# Patient Record
Sex: FEMALE | Race: OTHER | URBAN - METROPOLITAN AREA
[De-identification: names, ages, dates, MRNs, and addresses within clinical notes are randomized per-mention and may not be internally consistent; named-entity substitution may affect disease eponyms.]

---

## 2023-05-31 ENCOUNTER — TELEPHONE (OUTPATIENT)
Dept: OBGYN CLINIC | Facility: CLINIC | Age: 23
End: 2023-05-31

## 2023-05-31 NOTE — TELEPHONE ENCOUNTER
Pts LMP of 4/7/2023 making her 7w5d. Pt states regular cycles of every 30 days. States +UPT. Pt informed of both male and female providers and the need to rotate PN appt with all since OB on-call will be the one that delivers her. Pt accepts rotation and wishes to proceed with establishing care. Pt instructed to start OTC PNV with DHA, FOLIC ACID AND IRON.   Pt accepts PC OBN on 6/13/2023    Stated HT: 5 ft 4in  Stated Pre-pregnancy WT: 147 lb

## 2023-07-13 LAB
AMB EXT ANTIBODY SCREEN: NEGATIVE
AMB EXT HBSAG SCREEN: NEGATIVE
AMB EXT HEMATOCRIT: 34.4
AMB EXT HEMOGLOBIN: 11
AMB EXT MCV: 91.5
AMB EXT PLATELETS: 282
AMB EXT RH FACTOR: POSITIVE
AMB EXT RUBELLA ANTIBODIES, IGG: 4.3
AMB EXT URINE CULTURE ROUTINE: NO GROWTH
AMB EXT VITAMIN D, 1, 25-DIHYDROXY: 17.5

## 2023-11-09 ENCOUNTER — TELEPHONE (OUTPATIENT)
Dept: OBGYN CLINIC | Facility: CLINIC | Age: 23
End: 2023-11-09

## 2023-11-09 NOTE — TELEPHONE ENCOUNTER
Records Received. Per Records Due Date 24    Name and  verified    Patient offered meet and greet and accepted. Scheduled  with Lisa Snell.

## 2023-11-13 ENCOUNTER — OFFICE VISIT (OUTPATIENT)
Dept: OBGYN CLINIC | Facility: CLINIC | Age: 23
End: 2023-11-13
Payer: COMMERCIAL

## 2023-11-13 DIAGNOSIS — Z71.89 PRENATAL CONSULT: Primary | ICD-10-CM

## 2023-11-14 ENCOUNTER — TELEPHONE (OUTPATIENT)
Dept: OBGYN CLINIC | Facility: CLINIC | Age: 23
End: 2023-11-14

## 2023-11-14 PROBLEM — Z87.898 HISTORY OF SYNCOPE: Status: ACTIVE | Noted: 2023-11-14

## 2023-11-14 PROBLEM — Z87.820 HISTORY OF TRAUMATIC BRAIN INJURY: Status: ACTIVE | Noted: 2022-03-21

## 2023-11-14 PROBLEM — S06.9XAA TRAUMATIC BRAIN INJURY (HCC): Status: ACTIVE | Noted: 2022-03-21

## 2023-11-14 PROBLEM — F41.9 ANXIETY: Status: ACTIVE | Noted: 2018-09-20

## 2023-11-14 PROBLEM — R55 SYNCOPE: Status: ACTIVE | Noted: 2023-11-14

## 2023-11-14 PROBLEM — I60.9 SUBARACHNOID HEMORRHAGE (HCC): Status: ACTIVE | Noted: 2023-11-14

## 2023-11-14 PROBLEM — Z86.79 HISTORY OF SUBARACHNOID HEMORRHAGE: Status: ACTIVE | Noted: 2023-11-14

## 2023-11-14 NOTE — TELEPHONE ENCOUNTER
Spoke with Dionte Amador. She would like patient to be seen this week with a midwife. Patient will need nurse education and visit with midwife this week. Spoke with Patient (name and  verified) patient scheduled for 11/15 8 am nurse education and then 1:45 pm New OB with MES.

## 2023-11-15 ENCOUNTER — NURSE ONLY (OUTPATIENT)
Dept: OBGYN CLINIC | Facility: CLINIC | Age: 23
End: 2023-11-15
Payer: COMMERCIAL

## 2023-11-15 ENCOUNTER — INITIAL PRENATAL (OUTPATIENT)
Dept: OBGYN CLINIC | Facility: CLINIC | Age: 23
End: 2023-11-15
Payer: COMMERCIAL

## 2023-11-15 VITALS — HEIGHT: 65 IN

## 2023-11-15 VITALS
HEART RATE: 103 BPM | WEIGHT: 199 LBS | DIASTOLIC BLOOD PRESSURE: 81 MMHG | SYSTOLIC BLOOD PRESSURE: 124 MMHG | BODY MASS INDEX: 33 KG/M2

## 2023-11-15 DIAGNOSIS — Z34.03 PRENATAL CARE, FIRST PREGNANCY IN THIRD TRIMESTER: Primary | ICD-10-CM

## 2023-11-15 DIAGNOSIS — Z34.90 ENCOUNTER FOR SUPERVISION OF NORMAL PREGNANCY, ANTEPARTUM, UNSPECIFIED GRAVIDITY: Primary | ICD-10-CM

## 2023-11-15 PROCEDURE — 90715 TDAP VACCINE 7 YRS/> IM: CPT | Performed by: ADVANCED PRACTICE MIDWIFE

## 2023-11-15 PROCEDURE — 3079F DIAST BP 80-89 MM HG: CPT | Performed by: ADVANCED PRACTICE MIDWIFE

## 2023-11-15 PROCEDURE — 90471 IMMUNIZATION ADMIN: CPT | Performed by: ADVANCED PRACTICE MIDWIFE

## 2023-11-15 PROCEDURE — 3074F SYST BP LT 130 MM HG: CPT | Performed by: ADVANCED PRACTICE MIDWIFE

## 2023-11-15 RX ORDER — CHOLECALCIFEROL (VITAMIN D3) 25 MCG
1 TABLET,CHEWABLE ORAL DAILY
COMMUNITY

## 2023-11-15 NOTE — PROGRESS NOTES
Manpreet Fregoso, is at 31w5d, here for her NOB visit/Transfer of Care. Currently, she is feeling well. Denies 3rd trimester danger signs. Accepts Tdap today. Declines flu shot and will check with insurance about RSV. Vital signs and weight reviewed  See flowsheets     Assessment/Plan: Tdap today. Will go to lab after visit. Ultrasound ordered  Next visit: 2 weeks    Reviewed:   Prenatal visit schedule  Recommendations and rationale for TDAP, flu shot, RSV, and COVID vaccine(s) in pregnancy   labor precautions  Kick counts  Danger signs    Pt verbalized understanding. All questions answered.  No barriers to learning identified

## 2023-11-15 NOTE — PROGRESS NOTES
Phone Nurse Education Completed  PT verbalized understanding     Labs from records entered in results console. Labs needed- Initial OB labs- HCV, Varicella,Hgb Electrophoresis, A1C.  28 week labs-1 hour GTT, CBC, HIV, Trep        Abnormal Pap- No  Last Pap- Beginning of this pregnancy  BMI- 22.47  Genetic Testing- Completed   Circumcision- Having a Girl  Feeding- Breast and Formula  Emergency Transfusion- Agreeable  EPDS-0  Type of birth- Desires Epidural  How Chase Bradshaw.. Has answered NO 5P questions and has NO  risk factors. Pt. Given What pregnant women need to know handout. Patient verbalized understanding that Access Information Managementt messaging is to be used for non-urgent medical questions, and to call the office with any vaginal bleeding, leaking of fluid, cramping, decreased fetal movement, or any other urgent medical question.

## 2023-11-15 NOTE — PROGRESS NOTES
Jourdan Foley is a  at 31.3wks. She was seen at [facility] in [facility] at beginning of pregnancy. Last visit was in August. She reports she was seeing a male provider and had wanted a female provider for delivery so decided to leave that practice. There was then a delay with getting on her partner's insurance. Pt. denies any medical conditions. Desires CNM care. Midwifery care & philosophy discussed. Practice guidelines discussed. Discussed need for anatomy ultrasound and third trimester labs if patient were to transfer. She states she is amenable to both and would make herself available to come in later this week if accepted into practice. Informed her I would consult with other midwives and we would make a decision regarding appropriateness for care.

## 2023-11-16 ENCOUNTER — HOSPITAL ENCOUNTER (OUTPATIENT)
Dept: ULTRASOUND IMAGING | Facility: HOSPITAL | Age: 23
Discharge: HOME | End: 2023-11-16
Attending: ADVANCED PRACTICE MIDWIFE
Payer: COMMERCIAL

## 2023-11-16 DIAGNOSIS — Z34.03 PRENATAL CARE, FIRST PREGNANCY IN THIRD TRIMESTER: ICD-10-CM

## 2023-11-16 PROCEDURE — 76805 OB US >/= 14 WKS SNGL FETUS: CPT | Performed by: ADVANCED PRACTICE MIDWIFE

## 2023-11-29 DIAGNOSIS — O44.00 PLACENTA PREVIA WITHOUT HEMORRHAGE, ANTEPARTUM: Primary | ICD-10-CM

## 2023-12-01 ENCOUNTER — PATIENT MESSAGE (OUTPATIENT)
Dept: PERINATAL CARE | Facility: HOSPITAL | Age: 23
End: 2023-12-01

## 2023-12-01 DIAGNOSIS — O24.419 GESTATIONAL DIABETES MELLITUS (GDM), ANTEPARTUM, GESTATIONAL DIABETES METHOD OF CONTROL UNSPECIFIED: Primary | ICD-10-CM

## 2023-12-04 ENCOUNTER — HOSPITAL ENCOUNTER (OUTPATIENT)
Dept: PERINATAL CARE | Facility: HOSPITAL | Age: 23
Discharge: HOME | End: 2023-12-04
Attending: OBSTETRICS & GYNECOLOGY
Payer: COMMERCIAL

## 2023-12-04 ENCOUNTER — TELEPHONE (OUTPATIENT)
Dept: OBGYN CLINIC | Facility: CLINIC | Age: 23
End: 2023-12-04

## 2023-12-04 ENCOUNTER — LAB ENCOUNTER (OUTPATIENT)
Dept: LAB | Facility: HOSPITAL | Age: 23
End: 2023-12-04
Attending: ADVANCED PRACTICE MIDWIFE
Payer: COMMERCIAL

## 2023-12-04 ENCOUNTER — ROUTINE PRENATAL (OUTPATIENT)
Dept: OBGYN CLINIC | Facility: CLINIC | Age: 23
End: 2023-12-04
Payer: COMMERCIAL

## 2023-12-04 ENCOUNTER — HOSPITAL ENCOUNTER (OUTPATIENT)
Dept: PERINATAL CARE | Facility: HOSPITAL | Age: 23
End: 2023-12-04
Attending: ADVANCED PRACTICE MIDWIFE
Payer: COMMERCIAL

## 2023-12-04 VITALS
HEART RATE: 111 BPM | WEIGHT: 211 LBS | DIASTOLIC BLOOD PRESSURE: 82 MMHG | SYSTOLIC BLOOD PRESSURE: 137 MMHG | BODY MASS INDEX: 35 KG/M2

## 2023-12-04 VITALS
BODY MASS INDEX: 35 KG/M2 | WEIGHT: 209 LBS | HEART RATE: 125 BPM | DIASTOLIC BLOOD PRESSURE: 79 MMHG | SYSTOLIC BLOOD PRESSURE: 142 MMHG

## 2023-12-04 DIAGNOSIS — O44.00 PLACENTA PREVIA WITHOUT HEMORRHAGE, ANTEPARTUM: ICD-10-CM

## 2023-12-04 DIAGNOSIS — Z34.03 PRENATAL CARE, FIRST PREGNANCY IN THIRD TRIMESTER: Primary | ICD-10-CM

## 2023-12-04 DIAGNOSIS — O44.00 PLACENTA PREVIA WITHOUT HEMORRHAGE, ANTEPARTUM: Primary | ICD-10-CM

## 2023-12-04 DIAGNOSIS — Z34.90 ENCOUNTER FOR SUPERVISION OF NORMAL PREGNANCY, ANTEPARTUM, UNSPECIFIED GRAVIDITY: ICD-10-CM

## 2023-12-04 LAB
DEPRECATED RDW RBC AUTO: 42.4 FL (ref 35.1–46.3)
ERYTHROCYTE [DISTWIDTH] IN BLOOD BY AUTOMATED COUNT: 14.5 % (ref 11–15)
EST. AVERAGE GLUCOSE BLD GHB EST-MCNC: 140 MG/DL (ref 68–126)
GLUCOSE 1H P GLC SERPL-MCNC: 190 MG/DL
HBA1C MFR BLD: 6.5 % (ref ?–5.7)
HCT VFR BLD AUTO: 31.3 %
HCV AB SERPL QL IA: NONREACTIVE
HGB BLD-MCNC: 10 G/DL
MCH RBC QN AUTO: 25.6 PG (ref 26–34)
MCHC RBC AUTO-ENTMCNC: 31.9 G/DL (ref 31–37)
MCV RBC AUTO: 80.1 FL
PLATELET # BLD AUTO: 331 10(3)UL (ref 150–450)
RBC # BLD AUTO: 3.91 X10(6)UL
WBC # BLD AUTO: 9.2 X10(3) UL (ref 4–11)

## 2023-12-04 PROCEDURE — 87389 HIV-1 AG W/HIV-1&-2 AB AG IA: CPT | Performed by: ADVANCED PRACTICE MIDWIFE

## 2023-12-04 PROCEDURE — 76817 TRANSVAGINAL US OBSTETRIC: CPT

## 2023-12-04 PROCEDURE — 3075F SYST BP GE 130 - 139MM HG: CPT | Performed by: ADVANCED PRACTICE MIDWIFE

## 2023-12-04 PROCEDURE — 86787 VARICELLA-ZOSTER ANTIBODY: CPT | Performed by: ADVANCED PRACTICE MIDWIFE

## 2023-12-04 PROCEDURE — 83036 HEMOGLOBIN GLYCOSYLATED A1C: CPT | Performed by: ADVANCED PRACTICE MIDWIFE

## 2023-12-04 PROCEDURE — 82950 GLUCOSE TEST: CPT | Performed by: ADVANCED PRACTICE MIDWIFE

## 2023-12-04 PROCEDURE — 83021 HEMOGLOBIN CHROMOTOGRAPHY: CPT | Performed by: ADVANCED PRACTICE MIDWIFE

## 2023-12-04 PROCEDURE — 86780 TREPONEMA PALLIDUM: CPT | Performed by: ADVANCED PRACTICE MIDWIFE

## 2023-12-04 PROCEDURE — 86803 HEPATITIS C AB TEST: CPT | Performed by: ADVANCED PRACTICE MIDWIFE

## 2023-12-04 PROCEDURE — 76815 OB US LIMITED FETUS(S): CPT | Performed by: OBSTETRICS & GYNECOLOGY

## 2023-12-04 PROCEDURE — 76819 FETAL BIOPHYS PROFIL W/O NST: CPT

## 2023-12-04 PROCEDURE — 3079F DIAST BP 80-89 MM HG: CPT | Performed by: ADVANCED PRACTICE MIDWIFE

## 2023-12-04 PROCEDURE — 85027 COMPLETE CBC AUTOMATED: CPT | Performed by: ADVANCED PRACTICE MIDWIFE

## 2023-12-04 PROCEDURE — 83020 HEMOGLOBIN ELECTROPHORESIS: CPT | Performed by: ADVANCED PRACTICE MIDWIFE

## 2023-12-04 NOTE — TELEPHONE ENCOUNTER
Incoming call from [facility]. Patient's BP was 142/79 when checked and did not have a recheck. Patient has a 3:45 appt with Patricio Woody today. Secure chat to Patricio Woody to make aware.

## 2023-12-04 NOTE — PROGRESS NOTES
Feeling well. Endorses regular fetal movement. Denies contractions, LOF, vaginal bleeding. Came from [facility]. Placenta no longer low lying. Baby cephalic. BP was mildly elevated there. Normal here. Denies HA, vision changes, epigastric pain. Will complete third trimester labs following visit. Reviewed warning signs and when to call.  LEEANNA 2 wks

## 2023-12-05 ENCOUNTER — TELEPHONE (OUTPATIENT)
Dept: OBGYN CLINIC | Facility: CLINIC | Age: 23
End: 2023-12-05

## 2023-12-05 ENCOUNTER — TELEPHONE (OUTPATIENT)
Dept: PERINATAL CARE | Facility: HOSPITAL | Age: 23
End: 2023-12-05

## 2023-12-05 DIAGNOSIS — O24.419 GESTATIONAL DIABETES MELLITUS (GDM) IN THIRD TRIMESTER, GESTATIONAL DIABETES METHOD OF CONTROL UNSPECIFIED (HCC): Primary | ICD-10-CM

## 2023-12-05 DIAGNOSIS — E11.9 DIABETES (HCC): Primary | ICD-10-CM

## 2023-12-05 NOTE — TELEPHONE ENCOUNTER
Spoke with Dr. Lynn Abrams regarding 1hr  and Hgb A1C of 6.5 at 34.4wks. Per his recommendation patient to be scheduled with diabetes educator, MFM for likely initiation of insulin and their office for transfer of care. Patient also to start checking glucose level QID. Phone call to patient to explain plan of care. Will coordinate appts through our office, MFM and MD group.

## 2023-12-05 NOTE — TELEPHONE ENCOUNTER
Pt is  Received call from Elvia Bonds. Pt's A1c and 1 hour glucose from yesterday shows that she is diabetic and will need to be started on insulin. Per elvia, pt has appointment with EMG next week for transfer of care, and M has been notified to schedule pt for an appointment.     Order for the diabetes center placed. Message left on the office's voicemail of pt's information and the need to be seen as soon as possible. Requested call back.    Pt's pharmacy- [id number] - [facility] [address], [phone], [phone] called and glucometer and lancets ordered for pt.      Pt called and informed that glucometer was ordered and that the diabetes center will be contacting her tomorrow to set up an appointment. Pt voices she will log her glucose reading in a log book for now.

## 2023-12-05 NOTE — TELEPHONE ENCOUNTER
Rcd call From MW Pt elevated A1c and 1 hour gtt   Pt to patricia to Dr [facility] Corrales group  MFM req to follow DE    Pt needs DE appointment  and supplies for testing  Spoke to Katiana GARCIA at Dr [facility] Corrales office    Spoke to PT re blood glucose flow sheet and process

## 2023-12-06 ENCOUNTER — TELEPHONE (OUTPATIENT)
Dept: OBGYN CLINIC | Facility: CLINIC | Age: 23
End: 2023-12-06

## 2023-12-06 LAB
HGB A2 MFR BLD: 2.2 % (ref 1.5–3.5)
HGB PNL BLD ELPH: 97.2 % (ref 95.5–100)
T PALLIDUM AB SER QL: NEGATIVE
VZV IGG SER IA-ACNC: 343.5 (ref 165–?)

## 2023-12-06 NOTE — TELEPHONE ENCOUNTER
Jesse Cox called from M. Pt has not had DM ed yet, so she has not started checking or logging her BS. Pt needs to have a log so that MFM can properly treat her. Pt is sked for DM ed on 12/7. RN sent staff msg to MA (pt has an appt on 12/14) to inform her.

## 2023-12-07 ENCOUNTER — HOSPITAL ENCOUNTER (OUTPATIENT)
Dept: ENDOCRINOLOGY | Age: 23
Discharge: HOME | End: 2023-12-07
Attending: ADVANCED PRACTICE MIDWIFE
Payer: COMMERCIAL

## 2023-12-07 DIAGNOSIS — O24.410 DIET CONTROLLED GESTATIONAL DIABETES MELLITUS (GDM) IN THIRD TRIMESTER: Primary | ICD-10-CM

## 2023-12-07 DIAGNOSIS — O24.419 GESTATIONAL DIABETES MELLITUS (GDM) IN THIRD TRIMESTER, GESTATIONAL DIABETES METHOD OF CONTROL UNSPECIFIED: ICD-10-CM

## 2023-12-07 NOTE — PROGRESS NOTES
Edil Talbert  : 2000 was seen for Gestational Diabetes Counseling: Individual  35 weeks gestation     Date: 2023   Start time: 1:30 pm End time: 2:30 pm     Obtained usual diet history: B: oatmeal with water  L: Chicken and rice and broccoli   D: chicken  and rice and some mac and cheese     Education:     GDM Overview:  Reviewed gestational diabetes as diagnosis including target blood glucose values. Benefits, risks, and management options for improving/maintaining glucose control to mother/baby discussed. Instructed /demonstrated ability to perform blood glucose testing on: One touch verio  BG 96  mg/dL fasting  Discussed monitoring ketones. Healthy Eating:  Discussed nutrition concepts for pregnancy/healthy eating and effects of food on BG value. Timing of meals; what is a carbohydrate, protein, fat. Taught: Carb counting, label reading, meal planning. Suggested Calorie Level: 2000    Being Active:  Benefits and effects of activity on BG discussed. Monitoring:  Instructed on how to use glucose monitor/proper lancet disposal. Testing schedules are:   Fastin-95 mg/dL, Call MD if >95 mg/dL twice in 1 week   2 Hour Post Prandial:  120 md/dL, Call MD if >120 mg/dL twice in 1 week. Taking Medication:  Reviewed when medication might be indicated. Reducing Risk:  Effects of elevated blood glucose on mother/baby reviewed. Discussed management (hyperglycemia, hypoglycemia, sick day, DKA, other) and when to call provider. Post pregnancy management/prevention of Type 2 DM, and increased risk of having diabetes later in life reviewed. Healthy Coping:  Family involvement/social support encouraged. Identification of lifestyle behaviors willing to change discussed. Training Tools Provided:  Printed materials covering each topic provided. Support Plan provided and reviewed. Patient Chosen Goals:      1. Follow recommended GDM meal plan.    2. Begin testing fasting glucose and 2 hours after meals   3. Bring glucose log to each MD office visit. 4. Encouraged activity if no restrictions. 5. Encouraged Ale Bergman to call diabetes center with any questions or concerns. Patient verbalized understanding and has no further questions at this time.     Ethan Kidd RN,MSN, [facility]

## 2023-12-12 ENCOUNTER — TELEPHONE (OUTPATIENT)
Dept: OBGYN CLINIC | Facility: CLINIC | Age: 23
End: 2023-12-12

## 2023-12-12 ENCOUNTER — TELEPHONE (OUTPATIENT)
Dept: PERINATAL CARE | Facility: HOSPITAL | Age: 23
End: 2023-12-12

## 2023-12-12 DIAGNOSIS — Z34.03 ENCOUNTER FOR SUPERVISION OF NORMAL FIRST PREGNANCY IN THIRD TRIMESTER: Primary | ICD-10-CM

## 2023-12-12 NOTE — TELEPHONE ENCOUNTER
RN spoke with pt and sked her for growth US at OP office on 12/13. Directions sent via [employer]. Pt verbalized understanding and agreed with POC.

## 2023-12-12 NOTE — TELEPHONE ENCOUNTER
35w4d  Received BS log for date range 12/6-12/11     Low  High Out of Range   Fasting Blood Sugar 88 107 5/6   Post Breakfast 87 123 1/6   Post Lunch 101 150 1/6   Post Dinner 106 133 2/6     Diet    Pt had A1c   6.5 with    MW  transferred care to [facility] group  Req we follow blood glucose

## 2023-12-12 NOTE — TELEPHONE ENCOUNTER
SHERLY (per MFM): RN spoke with Shravan Rutledge at [facility]. Pt's BS readings were assessed and pt started on metformin. Pt has an appt with MA on 12/14. If MA would like pt to be scanned by [facility], MA can place orders at that time and [facility] will make an appt for the pt. 10 mins  JIMI Marlow MD  yes, MFM scan is OK. Please place order. thanks  9 mins  For growth?  9 mins  JIMI Marlow MD  who is this patient?! LOL NVM. Can you see if she can get in with Ambreen for a growth scan? thanks  8 mins  Before she sees you on the 14th? She's a BRAYDEN from the midwives.   7 mins  JIMI Marlow MD  sure, yes

## 2023-12-12 NOTE — TELEPHONE ENCOUNTER
RN spoke with Naida Hutchins at [facility]. Pt's BS readings were assessed and pt started on metformin. Pt has an appt with MA on 12/14. If MA would like pt to be scanned by MFM, MA can place orders at that time and MFM will make an appt for the pt.

## 2023-12-12 NOTE — TELEPHONE ENCOUNTER
New regimen:    Start metformin 500 mg p.o. nightly -please inform patient that prescription was sent to her pharmacy    Caleb Mejía. Joanne Bledsoe M.D.   [facility]

## 2023-12-13 ENCOUNTER — ULTRASOUND ENCOUNTER (OUTPATIENT)
Dept: OBGYN CLINIC | Facility: CLINIC | Age: 23
End: 2023-12-13
Payer: COMMERCIAL

## 2023-12-13 DIAGNOSIS — Z34.03 ENCOUNTER FOR SUPERVISION OF NORMAL FIRST PREGNANCY IN THIRD TRIMESTER: ICD-10-CM

## 2023-12-13 PROCEDURE — 76816 OB US FOLLOW-UP PER FETUS: CPT | Performed by: OBSTETRICS & GYNECOLOGY

## 2023-12-14 ENCOUNTER — HOSPITAL ENCOUNTER (OUTPATIENT)
Dept: ENDOCRINOLOGY | Age: 23
Discharge: HOME | End: 2023-12-14
Attending: ADVANCED PRACTICE MIDWIFE
Payer: COMMERCIAL

## 2023-12-14 ENCOUNTER — INITIAL PRENATAL (OUTPATIENT)
Dept: OBGYN CLINIC | Facility: CLINIC | Age: 23
End: 2023-12-14
Payer: COMMERCIAL

## 2023-12-14 VITALS
SYSTOLIC BLOOD PRESSURE: 124 MMHG | WEIGHT: 210.63 LBS | DIASTOLIC BLOOD PRESSURE: 74 MMHG | BODY MASS INDEX: 35.09 KG/M2 | HEIGHT: 65 IN

## 2023-12-14 DIAGNOSIS — Z34.90 ENCOUNTER FOR SUPERVISION OF NORMAL PREGNANCY, ANTEPARTUM, UNSPECIFIED GRAVIDITY: Primary | ICD-10-CM

## 2023-12-14 DIAGNOSIS — O24.410 DIET CONTROLLED GESTATIONAL DIABETES MELLITUS (GDM) IN THIRD TRIMESTER: Primary | ICD-10-CM

## 2023-12-14 DIAGNOSIS — O24.419 GESTATIONAL DIABETES MELLITUS (GDM) IN THIRD TRIMESTER, GESTATIONAL DIABETES METHOD OF CONTROL UNSPECIFIED: ICD-10-CM

## 2023-12-14 PROCEDURE — 3008F BODY MASS INDEX DOCD: CPT | Performed by: OBSTETRICS & GYNECOLOGY

## 2023-12-14 PROCEDURE — 87150 DNA/RNA AMPLIFIED PROBE: CPT | Performed by: OBSTETRICS & GYNECOLOGY

## 2023-12-14 PROCEDURE — 3074F SYST BP LT 130 MM HG: CPT | Performed by: OBSTETRICS & GYNECOLOGY

## 2023-12-14 PROCEDURE — 87081 CULTURE SCREEN ONLY: CPT | Performed by: OBSTETRICS & GYNECOLOGY

## 2023-12-14 PROCEDURE — 59025 FETAL NON-STRESS TEST: CPT | Performed by: OBSTETRICS & GYNECOLOGY

## 2023-12-14 PROCEDURE — 3078F DIAST BP <80 MM HG: CPT | Performed by: OBSTETRICS & GYNECOLOGY

## 2023-12-14 NOTE — PROGRESS NOTES
Nathan Garcia  : 2000 was seen for GDM  Individual Follow-Up Counseling   Video Visit    Due to COVID-19 ACTION PLAN, the patient's office visit was conducted via real-time interactive audio and video. The patient verbally consents to an audio and video consultation today which was conducted due to Covid-19/public health pandemic. The patient understands and accepts financial responsibility for any deductible, co-insurance and/or co-pays associated with this service. Please note that this visit was completed using two-way, real-time interactive audio and video communication. This has been done in good micheline to provide diabetes education during the on-going public health crisis/national emergency and because of restrictions of face-to-face office visits. Date: 2023  Referring Provider: Isabella Mehta Start time: 1030 End time: 1130      Assessment: There were no vitals taken for this visit. Weight:   Wt Readings from Last 6 Encounters:   23 209 lb   23 211 lb   11/15/23 199 lb       Blood Glucose: All glucose levels have been within normal limits  :  Patient reports taking 500 mg of Metformin q evening and fasting numbers are now within normal limits    Gestational Diabetes goals assessed by patient: 4 - frequently, continue with current goals/plan    Diet:     Following meal plan: yes  Skips: no meals are Meals are Balanced. Carb Intake is adequate. Protein Intake is adequate. Food Selections are healthy. Exercise:     Some walking    Education:     GDM Overview:  Reviewed target blood glucose values for GDM. Discussed benefits/risks to mother/baby management options to improve/maintain glucose control. Healthy Eating:  Reviewed/Reinforced:  Nutrition concepts for pregnancy/healthy eating and effect of food on blood glucose. Meal planning process and benefits of pre-planning meals/snacks. Appropriate timing of meals/snacks. Carb counting.   Additional concepts: Increasing fiber    Being Active:  Reviewed benefits of effects of activity on BG values. Reviewed types of activity, duration, precautions. Monitoring:  Instructed to report readings to MD as directed. Call MD: if FBG is > 95 twice in 1 week. If 2 hr PP is > 120 twice in 1 week at any one meal.  Call Diabetic Educator is ketones are consistently elevated. Taking Medication:  Reviewed appropriate timing (if on insulin) of meds. Reviewed probability of needing medication adjustments throughout pregnancy. Reducing Risk:  Discussed management of (hyperglycemia, hypoglycemia) and when to call provider. Recommendations:      1. Follow recommended meal plan. 2. Test blood glucose and ketones as directed. 3. Bring glucose log to each MD visit. 4. Start/continue activity if no restrictions. 5. Additional recommendations: continue to follow OB protocol until delivery. Patient verbalized understanding and has no further questions at this time.     Carlyn Gardner, RN,MSN, CDCES, PMHNP-BC

## 2023-12-14 NOTE — PROGRESS NOTES
[facility]  Obstetrics and Gynecology  History & Physical    CC: Patient is here to establish prenatal care     Subjective:     HPI:  Abiodun Silva is a 24year old  female at 26w5d who presents today to establish prenatal care. Transfer from [facility] for [address] - started on metformin 2 days ago. MFM following. Patient reports no c/o. Denies vaginal bleeding, abdominal/pelvic pain, nausea and vomiting. LMP: No LMP recorded. Patient is pregnant. AUDREY:  Estimated Date of Delivery: 24    OB:  OB History    Para Term  AB Living   1             SAB IAB Ectopic Multiple Live Births                  # Outcome Date GA Lbr Marko/2nd Weight Sex Delivery Anes PTL Lv   1 Current                  GYN:   Hx Prior Abnormal Pap: No (11/15/2023  8:04 AM)      PMH:   Past Medical History:   Diagnosis Date    Iron deficiency        PSH:  No past surgical history on file. MEDS:    Current Outpatient Medications:     metFORMIN 500 MG Oral Tab, Take 1 tablet (500 mg total) by mouth every evening., Disp: 30 tablet, Rfl: 3    prenatal vitamin with DHA 27-0.8-228 MG Oral Cap, Take 1 capsule by mouth daily. , Disp: , Rfl:     Allergies:    Not on File    Immunizations:  Immunization History   Administered Date(s) Administered    BCG 2000    DTAP 2000, 10/12/1999, 10/03/2003    DTAP INFANRIX 10/03/2003    DTP 2000, 10/25/1999, 12/10/1999, 2001    HEP A,Ped/Adol,(2 Dose) 2008, 2009    HEP B, Ped/Adol 2003, 10/03/2003    HEP B/HIB 10/23/2002    HIB (4 Dose) 10/12/1999    HPV (Gardasil) 2011, 2012, 2013    Hib, Unspecified Formulation 1999, 10/12/1999, 10/25/1999, 12/10/1999    IPV 1999, 10/12/1999, 2001, 10/03/2003    Influenza 2013    MMR 2000, 10/03/2003    Meningococcal-Menactra 2011, 2015    OPV 1999, 10/25/1999, 12/10/1999    TD 2009    TDAP 2013, 11/15/2023       Family Hx: Family History   Problem Relation Age of Onset    No Known Problems Father     No Known Problems Mother     Hypertension Paternal Grandmother     High Cholesterol Paternal Grandmother        SocialHx:        Social History     Socioeconomic History    Marital status:    Tobacco Use    Smoking status: Never    Smokeless tobacco: Never   Substance and Sexual Activity    Alcohol use: Not Currently    Drug use: Never   Other Topics Concern    Caffeine Concern No    Special Diet No     Social Determinants of Health     Financial Resource Strain: Low Risk  (2023)    Financial Resource Strain     Difficulty of Paying Living Expenses: Not hard at all     Med Affordability: No   Food Insecurity: No Food Insecurity (2023)    Food Insecurity     Food Insecurity: Never true   Transportation Needs: No Transportation Needs (2023)    Transportation Needs     Lack of Transportation: No   Stress: No Stress Concern Present (2023)    Stress     Feeling of Stress : No   Housing Stability: Low Risk  (2023)    Housing Stability     Housing Instability: No         Review of Systems:  General: no complaints  Eyes: no complaints  Respiratory: no complaints  Cardiovascular: no complaints  GI: no complaints  : no complaints See HPI  Hematological/lymphatic: no complaints  Breast: no complaints  Psychiatric: no complaints  Endocrine:no complaints  Neurological: no complaints  Immunological: no complaints  Musculoskeletal:no complaints    Objective:     GENERAL: well developed, well nourished, in no apparent distress, alert and orientated X 3  PSYCH: mood and affect stable    ABDOMEN: Soft, non distended; non tender, no masses  GYNE/: deferred    R/V: normal perineum, no hemorrhoids  EXTREMITIES:  Normal range of motion, strength 5/5, nontender without edema    Fetal Well Being Assessment:    Reactive NST 140s    Assessment/Plan:     Anthony Carlton is a 24year old  female at 26w5d who presents today to establish prenatal care. Patient Active Problem List   Diagnosis    Anxiety    History of subarachnoid hemorrhage    History of traumatic brain injury    History of syncope    Placenta previa without hemorrhage, antepartum         ICD-10-CM    1. Encounter for supervision of normal pregnancy, antepartum, unspecified   Z34.90       2. Gestational diabetes mellitus (GDM) in third trimester, gestational diabetes method of control unspecified  O24.419 HIV AG AB Combo     TREP     Group B Strep PCR        Comment   Transabdominal images taken   Fetal growth appears to be 88.7 %. 3429 g  Overall Fetal size measuring larger than dates. RHODA is normal.   Placental location is Posterior   Fetal Position is Vertex       Prenatal care  - prenatal labs up to date  - GBS sent    [address]  - on metformin. MFM following sugars. - patient counseled on risks during pregnancy - stillbirth, labor/shoulder dystocia, fetal macrosomia, inc operative delivery, fetal distress, hemorrhage, etc. All questions answered. - weekly NSTs for now.    - consider induction of labor likely 39 weeks    RTC in 1 weeks     Soel Owen MD    [facility]

## 2023-12-15 LAB — GROUP B STREP BY PCR FOR PCR OVT: NEGATIVE

## 2023-12-18 ENCOUNTER — TELEPHONE (OUTPATIENT)
Dept: PERINATAL CARE | Facility: HOSPITAL | Age: 23
End: 2023-12-18

## 2023-12-19 ENCOUNTER — ROUTINE PRENATAL (OUTPATIENT)
Dept: OBGYN CLINIC | Facility: CLINIC | Age: 23
End: 2023-12-19
Payer: COMMERCIAL

## 2023-12-19 ENCOUNTER — TELEPHONE (OUTPATIENT)
Dept: OBGYN CLINIC | Facility: CLINIC | Age: 23
End: 2023-12-19

## 2023-12-19 VITALS
DIASTOLIC BLOOD PRESSURE: 76 MMHG | HEIGHT: 65 IN | SYSTOLIC BLOOD PRESSURE: 124 MMHG | WEIGHT: 203.81 LBS | BODY MASS INDEX: 33.96 KG/M2

## 2023-12-19 DIAGNOSIS — O24.415 ORAL HYPOGLYCEMIC CONTROLLED WHITE CLASSIFICATION A2 GESTATIONAL DIABETES MELLITUS (GDM): Primary | ICD-10-CM

## 2023-12-19 PROBLEM — O44.00 PLACENTA PREVIA WITHOUT HEMORRHAGE, ANTEPARTUM: Status: RESOLVED | Noted: 2023-11-29 | Resolved: 2023-12-19

## 2023-12-19 PROBLEM — O44.00 PLACENTA PREVIA WITHOUT HEMORRHAGE, ANTEPARTUM (HCC): Status: RESOLVED | Noted: 2023-11-29 | Resolved: 2023-12-19

## 2023-12-19 PROCEDURE — 3078F DIAST BP <80 MM HG: CPT | Performed by: OBSTETRICS & GYNECOLOGY

## 2023-12-19 PROCEDURE — 3074F SYST BP LT 130 MM HG: CPT | Performed by: OBSTETRICS & GYNECOLOGY

## 2023-12-19 PROCEDURE — 59025 FETAL NON-STRESS TEST: CPT | Performed by: OBSTETRICS & GYNECOLOGY

## 2023-12-19 PROCEDURE — 3008F BODY MASS INDEX DOCD: CPT | Performed by: OBSTETRICS & GYNECOLOGY

## 2023-12-19 NOTE — PROGRESS NOTES
No c/o. Denies pain, bleeding, LOF, positive fetal movement    Reactive NST    Ultrasound :  Fetal growth appears to be 88.7 %. RHODA is normal.   Placental location is Posterior   Fetal Position is Vertex     24year old  at 37w2d by 10/ wk US     Return OB  Pre-Joann Care: UTD.  Induction of labor at 39 weeks requested  Patient Active Problem List   Diagnosis    Anxiety    History of subarachnoid hemorrhage    History of traumatic brain injury    History of syncope    Oral hypoglycemic controlled White classification A2 gestational diabetes mellitus (GDM)     Continue weekly NSTs  - Return to clinic in: 1

## 2023-12-19 NOTE — TELEPHONE ENCOUNTER
----- Message from Neva Simpson MD sent at 12/19/2023  2:48 PM CST -----  Regarding: cervical ripening  Hi  Please schedule cervical ripening at 39 weeks [facility] 1/12/24) for this patient with A2 gestational DM.    Thanks  Dr. Tyler Dill

## 2023-12-20 ENCOUNTER — TELEPHONE (OUTPATIENT)
Dept: PERINATAL CARE | Facility: HOSPITAL | Age: 23
End: 2023-12-20

## 2023-12-20 NOTE — TELEPHONE ENCOUNTER
Blood Glucose flow sheet  reviewed  through 12/17        Recommendations         metformin 500 mg p.o. nightly        Blood glucose monitoring   With weekly review by MACIE

## 2023-12-26 ENCOUNTER — TELEPHONE (OUTPATIENT)
Dept: PERINATAL CARE | Facility: HOSPITAL | Age: 23
End: 2023-12-26

## 2023-12-26 NOTE — TELEPHONE ENCOUNTER
Blood Glucose flow sheet  reviewed  through 12/25        Recommendations           metformin 500 mg p.o. nightly          Blood glucose monitoring   With weekly review by MFM       My chart message sent with recommendations

## 2023-12-28 ENCOUNTER — ROUTINE PRENATAL (OUTPATIENT)
Dept: OBGYN CLINIC | Facility: CLINIC | Age: 23
End: 2023-12-28
Payer: COMMERCIAL

## 2023-12-28 VITALS — WEIGHT: 201.19 LBS | DIASTOLIC BLOOD PRESSURE: 78 MMHG | SYSTOLIC BLOOD PRESSURE: 126 MMHG | BODY MASS INDEX: 33 KG/M2

## 2023-12-28 DIAGNOSIS — O24.419 GESTATIONAL DIABETES MELLITUS (GDM) IN THIRD TRIMESTER, GESTATIONAL DIABETES METHOD OF CONTROL UNSPECIFIED: Primary | ICD-10-CM

## 2023-12-28 PROCEDURE — 3078F DIAST BP <80 MM HG: CPT | Performed by: OBSTETRICS & GYNECOLOGY

## 2023-12-28 PROCEDURE — 59025 FETAL NON-STRESS TEST: CPT | Performed by: OBSTETRICS & GYNECOLOGY

## 2023-12-28 PROCEDURE — 3074F SYST BP LT 130 MM HG: CPT | Performed by: OBSTETRICS & GYNECOLOGY

## 2023-12-28 NOTE — PROGRESS NOTES
No c/o. Denies pain, bleeding, LOF, positive fetal movement    Reactive NST    Ultrasound :  Fetal growth appears to be 88.7 %. RHODA is normal.   Placental location is Posterior   Fetal Position is Vertex     24year old  at 37w6d by 10/ wk US     Return OB  Pre-Joann Care: UTD.  Induction of labor at 39 weeks requested  Patient Active Problem List   Diagnosis    Anxiety    History of subarachnoid hemorrhage    History of traumatic brain injury    History of syncope    Oral hypoglycemic controlled White classification A2 gestational diabetes mellitus (GDM)     Continue weekly NSTs  - Return to clinic in: 1

## 2023-12-30 ENCOUNTER — PATIENT MESSAGE (OUTPATIENT)
Dept: OBGYN CLINIC | Facility: CLINIC | Age: 23
End: 2023-12-30

## 2023-12-31 ENCOUNTER — HOSPITAL ENCOUNTER (OUTPATIENT)
Facility: HOSPITAL | Age: 23
Discharge: HOME | End: 2023-12-31
Attending: OBSTETRICS & GYNECOLOGY | Admitting: OBSTETRICS & GYNECOLOGY
Payer: COMMERCIAL

## 2023-12-31 VITALS — HEART RATE: 103 BPM | SYSTOLIC BLOOD PRESSURE: 134 MMHG | DIASTOLIC BLOOD PRESSURE: 82 MMHG

## 2023-12-31 PROCEDURE — 59025 FETAL NON-STRESS TEST: CPT | Performed by: OBSTETRICS & GYNECOLOGY

## 2023-12-31 NOTE — TRIAGE
[facility]      Triage Note    Jennifer Arora Patient Status:  Outpatient    2000 MRN [id number]   Location [address] Attending Kirsty Eagle MD   Hosp Day # 0 PCP No primary care provider on file.  Para:   Estimated Date of Delivery: 24  Gestation: 36w4d    Chief Complaint     Complication         Allergies:  Not on File    Orders Placed This Encounter   Procedures    Bile Acids       Lab Results   Component Value Date    WBC 9.2 2023    HGB 10.0 (L) 2023    HCT 31.3 (L) 2023    .0 2023    RPR Nonrective 2023       Clinitek UA  Lab Results   Component Value Date    URINECUL No Growth 2023       UA  No results found for: \"COLORUR\", \"CLARITY\", \"SPECGRAVITY\", \"PROUR\", \"GLUUR\", \"KETUR\", \"BILUR\", \"BLOODURINE\", \"NITRITE\", \"UROBILINOGEN\", \"LEUUR\", \"UASA\"    Vitals:    23 1455 23 1515   BP: 142/79 134/82   Pulse: 111 103       NST  Variability: Moderate           Accelerations: Yes           Decelerations: None            Baseline: 145 BPM           Uterine Irritability: No           Contractions: Irregular                                        Acoustic Stimulator: No           Nonstress Test Interpretation: Appropriate for gestational age           Nonstress Test Second Interpretation: Reactive                        Additional Comments       Chief Complaint   Patient presents with     Complication     Sent from home with c/o itching     Pt c/o itching all over for past few days. Bile acids drawn as ordered. NST reactive with irregular contractions noted. Dr Marlyn Christopher informed of findings. Discharge order received. Pt instructed to notify OB of worsening symptome. Kick count and labor precautions reviewed with pt. Pt states understanding. Discharged to home.     Mariana Patricio RN  2023 3:47 PM

## 2023-12-31 NOTE — PROGRESS NOTES
Pt is a 24year old female admitted to TR1/TR1-A. Chief Complaint   Patient presents with     Complication     Sent from home with c/o itching      Pt is  38w2d intra-uterine pregnancy. History obtained, consents signed. Oriented to room, staff, and plan of care.

## 2024-01-01 PROBLEM — L29.9 ITCHING: Status: ACTIVE | Noted: 2024-01-01

## 2024-01-02 ENCOUNTER — TELEPHONE (OUTPATIENT)
Dept: PERINATAL CARE | Facility: HOSPITAL | Age: 24
End: 2024-01-02

## 2024-01-02 NOTE — NST
Nonstress Test   Patient: Yesika Hayward    Gestation: 38w3d    Diagnosis from order: Suspected cholestasis of pregnancy, Itching. NST:        12/31/2023   NST DOCUMENTATION   Variability 6-25 BPM   Accelerations Yes   Decelerations None   Baseline 145 BPM   Uterine Irritability No   Contractions Irregular   Acoustic Stimulator No   Nonstress Test Interpretation Appropriate for gestational age   Nonstress Test Second Interpretation Reactive         I agree with the above evaluation. NST completed. Dr. Jasmina Davies MD    [facility]     This note was created by [facility] voice recognition. Errors in content may be related to improper recognition by the system; efforts to review and correct have been done but errors may still exist. Please be advised the primary purpose of this note is for me to communicate medical care. Standard sentence structure is not always used. Medical terminology and medical abbreviations may be used. There may be grammatical, typographical, and automated fill ins that may have errors missed in proofreading.

## 2024-01-03 ENCOUNTER — TELEPHONE (OUTPATIENT)
Dept: PERINATAL CARE | Facility: HOSPITAL | Age: 24
End: 2024-01-03

## 2024-01-03 LAB — BILE ACIDS: 4.4 UMOL/L

## 2024-01-03 NOTE — TELEPHONE ENCOUNTER
Blood Glucose flow sheet  reviewed  through 12/31/24        Recommendations    metformin 500 mg p.o. nightl     Blood glucose monitoring   With weekly review by MACIE

## 2024-01-04 ENCOUNTER — TELEPHONE (OUTPATIENT)
Dept: OBGYN CLINIC | Facility: CLINIC | Age: 24
End: 2024-01-04

## 2024-01-04 NOTE — TELEPHONE ENCOUNTER
LMTCO regarding NST 1/5/2024. Requesting patient come in at 12p or earlier. [facility] only has 1 NST machine and 3 scheduled back to back. [id number] message sent      LMTCO regarding NST 1/5/2024. Requesting patient come in at 12p or earlier. [facility] only has 1 NST machine and 3 scheduled back to back

## 2024-01-05 ENCOUNTER — ROUTINE PRENATAL (OUTPATIENT)
Dept: OBGYN CLINIC | Facility: CLINIC | Age: 24
End: 2024-01-05
Payer: COMMERCIAL

## 2024-01-05 VITALS
BODY MASS INDEX: 33.19 KG/M2 | WEIGHT: 199.19 LBS | HEIGHT: 65 IN | DIASTOLIC BLOOD PRESSURE: 74 MMHG | SYSTOLIC BLOOD PRESSURE: 118 MMHG

## 2024-01-05 DIAGNOSIS — Z36.9 UNSPECIFIED ANTENATAL SCREENING: Primary | ICD-10-CM

## 2024-01-05 PROCEDURE — 3074F SYST BP LT 130 MM HG: CPT | Performed by: OBSTETRICS & GYNECOLOGY

## 2024-01-05 PROCEDURE — 3008F BODY MASS INDEX DOCD: CPT | Performed by: OBSTETRICS & GYNECOLOGY

## 2024-01-05 PROCEDURE — 59025 FETAL NON-STRESS TEST: CPT | Performed by: OBSTETRICS & GYNECOLOGY

## 2024-01-05 PROCEDURE — 3078F DIAST BP <80 MM HG: CPT | Performed by: OBSTETRICS & GYNECOLOGY

## 2024-01-05 NOTE — PROGRESS NOTES
Doing well. No OB complaints. +FM.   Noted diffuse rash that is extremely itchy. Has not tried OTC meds yet. PUPPS noted on exam. Recommend Calamine lotion and topical benadryl. PO benadryl also ok.   BG well controlled with Metformin.   Induction of labor scheduled for Wednesday.     LEEANNA postpartum.     Dr. Jase Dowd MD    [facility]     This note was created by [employer] voice recognition. Errors in content may be related to improper recognition by the system; efforts to review and correct have been done but errors may still exist. Please be advised the primary purpose of this note is for me to communicate medical care. Standard sentence structure is not always used. Medical terminology and medical abbreviations may be used. There may be grammatical, typographical, and automated fill ins that may have errors missed in proofreading.

## 2024-01-10 ENCOUNTER — HOSPITAL ENCOUNTER (INPATIENT)
Facility: HOSPITAL | Age: 24
LOS: 5 days | Discharge: HOME | End: 2024-01-15
Attending: OBSTETRICS & GYNECOLOGY | Admitting: OBSTETRICS & GYNECOLOGY
Payer: COMMERCIAL

## 2024-01-10 ENCOUNTER — TELEPHONE (OUTPATIENT)
Dept: OBGYN UNIT | Facility: HOSPITAL | Age: 24
End: 2024-01-10

## 2024-01-10 ENCOUNTER — APPOINTMENT (OUTPATIENT)
Dept: OBGYN CLINIC | Facility: HOSPITAL | Age: 24
End: 2024-01-10
Attending: OBSTETRICS & GYNECOLOGY
Payer: COMMERCIAL

## 2024-01-10 PROBLEM — Z34.90 PREGNANCY: Status: ACTIVE | Noted: 2024-01-10

## 2024-01-10 PROBLEM — Z34.90 PREGNANCY (HCC): Status: ACTIVE | Noted: 2024-01-10

## 2024-01-10 LAB
ALBUMIN SERPL-MCNC: 3.8 G/DL (ref 3.2–4.8)
ALBUMIN/GLOB SERPL: 1.3 {RATIO} (ref 1–2)
ALP LIVER SERPL-CCNC: 178 U/L
ALT SERPL-CCNC: 15 U/L
ANION GAP SERPL CALC-SCNC: 6 MMOL/L (ref 0–18)
ANTIBODY SCREEN: NEGATIVE
AST SERPL-CCNC: 26 U/L (ref ?–34)
BASOPHILS # BLD AUTO: 0.02 X10(3) UL (ref 0–0.2)
BASOPHILS NFR BLD AUTO: 0.2 %
BILIRUB SERPL-MCNC: 0.3 MG/DL (ref 0.3–1.2)
BUN BLD-MCNC: 7 MG/DL (ref 9–23)
BUN/CREAT SERPL: 9.7 (ref 10–20)
CALCIUM BLD-MCNC: 9.4 MG/DL (ref 8.7–10.4)
CHLORIDE SERPL-SCNC: 104 MMOL/L (ref 98–112)
CO2 SERPL-SCNC: 24 MMOL/L (ref 21–32)
CREAT BLD-MCNC: 0.72 MG/DL
DEPRECATED RDW RBC AUTO: 45.1 FL (ref 35.1–46.3)
EGFRCR SERPLBLD CKD-EPI 2021: 120 ML/MIN/1.73M2 (ref 60–?)
EOSINOPHIL # BLD AUTO: 0.71 X10(3) UL (ref 0–0.7)
EOSINOPHIL NFR BLD AUTO: 8.4 %
ERYTHROCYTE [DISTWIDTH] IN BLOOD BY AUTOMATED COUNT: 16.6 % (ref 11–15)
GLOBULIN PLAS-MCNC: 2.9 G/DL (ref 2.8–4.4)
GLUCOSE BLD-MCNC: 97 MG/DL (ref 70–99)
GLUCOSE BLDC GLUCOMTR-MCNC: 119 MG/DL (ref 70–99)
HCT VFR BLD AUTO: 31.6 %
HGB BLD-MCNC: 9.8 G/DL
IMM GRANULOCYTES # BLD AUTO: 0.03 X10(3) UL (ref 0–1)
IMM GRANULOCYTES NFR BLD: 0.4 %
LYMPHOCYTES # BLD AUTO: 1.52 X10(3) UL (ref 1–4)
LYMPHOCYTES NFR BLD AUTO: 17.9 %
MCH RBC QN AUTO: 23.6 PG (ref 26–34)
MCHC RBC AUTO-ENTMCNC: 31 G/DL (ref 31–37)
MCV RBC AUTO: 76.1 FL
MONOCYTES # BLD AUTO: 0.45 X10(3) UL (ref 0.1–1)
MONOCYTES NFR BLD AUTO: 5.3 %
NEUTROPHILS # BLD AUTO: 5.76 X10 (3) UL (ref 1.5–7.7)
NEUTROPHILS # BLD AUTO: 5.76 X10(3) UL (ref 1.5–7.7)
NEUTROPHILS NFR BLD AUTO: 67.8 %
OSMOLALITY SERPL CALC.SUM OF ELEC: 276 MOSM/KG (ref 275–295)
PLATELET # BLD AUTO: 318 10(3)UL (ref 150–450)
POTASSIUM SERPL-SCNC: 4.1 MMOL/L (ref 3.5–5.1)
PROT SERPL-MCNC: 6.7 G/DL (ref 5.7–8.2)
RBC # BLD AUTO: 4.15 X10(6)UL
RH BLOOD TYPE: POSITIVE
RH BLOOD TYPE: POSITIVE
SODIUM SERPL-SCNC: 134 MMOL/L (ref 136–145)
WBC # BLD AUTO: 8.5 X10(3) UL (ref 4–11)

## 2024-01-10 PROCEDURE — 3E0P7VZ INTRODUCTION OF HORMONE INTO FEMALE REPRODUCTIVE, VIA NATURAL OR ARTIFICIAL OPENING: ICD-10-PCS | Performed by: OBSTETRICS & GYNECOLOGY

## 2024-01-10 RX ORDER — ONDANSETRON 2 MG/ML
4 INJECTION INTRAMUSCULAR; INTRAVENOUS EVERY 6 HOURS PRN
Status: DISCONTINUED | OUTPATIENT
Start: 2024-01-10 | End: 2024-01-12 | Stop reason: HOSPADM

## 2024-01-10 RX ORDER — DEXTROSE, SODIUM CHLORIDE, SODIUM LACTATE, POTASSIUM CHLORIDE, AND CALCIUM CHLORIDE 5; .6; .31; .03; .02 G/100ML; G/100ML; G/100ML; G/100ML; G/100ML
INJECTION, SOLUTION INTRAVENOUS AS NEEDED
Status: DISCONTINUED | OUTPATIENT
Start: 2024-01-10 | End: 2024-01-12 | Stop reason: HOSPADM

## 2024-01-10 RX ORDER — IBUPROFEN 600 MG/1
600 TABLET ORAL ONCE AS NEEDED
Status: DISCONTINUED | OUTPATIENT
Start: 2024-01-10 | End: 2024-01-12 | Stop reason: HOSPADM

## 2024-01-10 RX ORDER — NALBUPHINE HYDROCHLORIDE 10 MG/ML
10 INJECTION, SOLUTION INTRAMUSCULAR; INTRAVENOUS; SUBCUTANEOUS EVERY 6 HOURS PRN
Status: DISCONTINUED | OUTPATIENT
Start: 2024-01-10 | End: 2024-01-12 | Stop reason: HOSPADM

## 2024-01-10 RX ORDER — ACETAMINOPHEN 500 MG
1000 TABLET ORAL EVERY 6 HOURS PRN
Status: DISCONTINUED | OUTPATIENT
Start: 2024-01-10 | End: 2024-01-12 | Stop reason: HOSPADM

## 2024-01-10 RX ORDER — ACETAMINOPHEN 500 MG
500 TABLET ORAL EVERY 6 HOURS PRN
Status: DISCONTINUED | OUTPATIENT
Start: 2024-01-10 | End: 2024-01-12 | Stop reason: HOSPADM

## 2024-01-10 RX ORDER — TERBUTALINE SULFATE 1 MG/ML
0.25 INJECTION, SOLUTION SUBCUTANEOUS AS NEEDED
Status: DISCONTINUED | OUTPATIENT
Start: 2024-01-10 | End: 2024-01-12 | Stop reason: HOSPADM

## 2024-01-10 RX ORDER — HYDROXYZINE HYDROCHLORIDE 50 MG/ML
50 INJECTION, SOLUTION INTRAMUSCULAR EVERY 6 HOURS PRN
Status: DISCONTINUED | OUTPATIENT
Start: 2024-01-10 | End: 2024-01-12 | Stop reason: HOSPADM

## 2024-01-10 RX ORDER — LIDOCAINE HYDROCHLORIDE 10 MG/ML
30 INJECTION, SOLUTION EPIDURAL; INFILTRATION; INTRACAUDAL; PERINEURAL ONCE
Status: DISCONTINUED | OUTPATIENT
Start: 2024-01-10 | End: 2024-01-12 | Stop reason: HOSPADM

## 2024-01-10 RX ORDER — SODIUM CHLORIDE, SODIUM LACTATE, POTASSIUM CHLORIDE, CALCIUM CHLORIDE 600; 310; 30; 20 MG/100ML; MG/100ML; MG/100ML; MG/100ML
INJECTION, SOLUTION INTRAVENOUS CONTINUOUS
Status: DISCONTINUED | OUTPATIENT
Start: 2024-01-10 | End: 2024-01-12 | Stop reason: HOSPADM

## 2024-01-10 RX ORDER — CITRIC ACID/SODIUM CITRATE 334-500MG
30 SOLUTION, ORAL ORAL AS NEEDED
Status: COMPLETED | OUTPATIENT
Start: 2024-01-10 | End: 2024-01-12

## 2024-01-10 RX ORDER — CALCIUM CARBONATE 500 MG/1
1000 TABLET, CHEWABLE ORAL EVERY 4 HOURS PRN
Status: DISCONTINUED | OUTPATIENT
Start: 2024-01-10 | End: 2024-01-12 | Stop reason: HOSPADM

## 2024-01-11 ENCOUNTER — ANESTHESIA (OUTPATIENT)
Dept: OBGYN UNIT | Facility: HOSPITAL | Age: 24
End: 2024-01-11
Payer: COMMERCIAL

## 2024-01-11 ENCOUNTER — ANESTHESIA EVENT (OUTPATIENT)
Dept: OBGYN UNIT | Facility: HOSPITAL | Age: 24
End: 2024-01-11
Payer: COMMERCIAL

## 2024-01-11 PROBLEM — O24.415: Chronic | Status: ACTIVE | Noted: 2023-12-19

## 2024-01-11 PROBLEM — O24.415 ORAL HYPOGLYCEMIC CONTROLLED WHITE CLASSIFICATION A2 GESTATIONAL DIABETES MELLITUS (GDM): Chronic | Status: ACTIVE | Noted: 2023-12-19

## 2024-01-11 LAB
CREAT UR-SCNC: 123 MG/DL
GLUCOSE BLDC GLUCOMTR-MCNC: 101 MG/DL (ref 70–99)
GLUCOSE BLDC GLUCOMTR-MCNC: 87 MG/DL (ref 70–99)
GLUCOSE BLDC GLUCOMTR-MCNC: 96 MG/DL (ref 70–99)
PROT UR-MCNC: 105 MG/DL (ref ?–14)
PROT/CREAT UR-RTO: 0.85

## 2024-01-11 PROCEDURE — 3E033VJ INTRODUCTION OF OTHER HORMONE INTO PERIPHERAL VEIN, PERCUTANEOUS APPROACH: ICD-10-PCS | Performed by: OBSTETRICS & GYNECOLOGY

## 2024-01-11 RX ORDER — BUPIVACAINE HCL/0.9 % NACL/PF 0.25 %
5 PLASTIC BAG, INJECTION (ML) EPIDURAL AS NEEDED
Status: DISCONTINUED | OUTPATIENT
Start: 2024-01-11 | End: 2024-01-15

## 2024-01-11 RX ORDER — NALBUPHINE HYDROCHLORIDE 10 MG/ML
2.5 INJECTION, SOLUTION INTRAMUSCULAR; INTRAVENOUS; SUBCUTANEOUS
Status: DISCONTINUED | OUTPATIENT
Start: 2024-01-11 | End: 2024-01-15

## 2024-01-11 RX ORDER — BUPIVACAINE HYDROCHLORIDE 2.5 MG/ML
20 INJECTION, SOLUTION EPIDURAL; INFILTRATION; INTRACAUDAL ONCE
Status: DISCONTINUED | OUTPATIENT
Start: 2024-01-11 | End: 2024-01-12 | Stop reason: HOSPADM

## 2024-01-11 RX ORDER — LIDOCAINE HYDROCHLORIDE 10 MG/ML
INJECTION, SOLUTION EPIDURAL; INFILTRATION; INTRACAUDAL; PERINEURAL AS NEEDED
Status: DISCONTINUED | OUTPATIENT
Start: 2024-01-11 | End: 2024-01-12 | Stop reason: SURG

## 2024-01-11 RX ORDER — LIDOCAINE HYDROCHLORIDE AND EPINEPHRINE 15; 5 MG/ML; UG/ML
INJECTION, SOLUTION EPIDURAL AS NEEDED
Status: DISCONTINUED | OUTPATIENT
Start: 2024-01-11 | End: 2024-01-12 | Stop reason: SURG

## 2024-01-11 RX ADMIN — LIDOCAINE HYDROCHLORIDE 5 ML: 10 INJECTION, SOLUTION EPIDURAL; INFILTRATION; INTRACAUDAL; PERINEURAL at 13:06:00

## 2024-01-11 RX ADMIN — LIDOCAINE HYDROCHLORIDE AND EPINEPHRINE 3 ML: 15; 5 INJECTION, SOLUTION EPIDURAL at 13:16:00

## 2024-01-11 NOTE — H&P
[facility]  Obstetrics and Gynecology  History & Physical    Marisa Borrero Patient Status:  Inpatient    2000 MRN [id number]   Location [facility] Attending Meg Zuniga DO   Hospital Day 1 PCP No primary care provider on file.     CC: Patient is here for induction of labor    SUBJECTIVE:    Marisa Borrero is a 23 year old  female at 39w6d Estimated Date of Delivery: 24 who is being admitted for induction of labor. Her current obstetrical history is significant for GDMA2 (on metformin). Patient reports no complaints.     negative UCx.    negative VB.   negative LOF.    positive Fetal movement.   negative Nausea, Vomiting, headache, vision changes and RUQ/Epigastric pain.       AUDREY Confirmation  LMP: No LMP recorded. Patient is pregnant.  AUDREY: 2024, by Ultrasound     OB Ultrasounds:   OB US: : cephalic, EFW 3429g, 89%      Obstetric History:   OB History    Para Term  AB Living   1             SAB IAB Ectopic Multiple Live Births                  # Outcome Date GA Lbr Marko/2nd Weight Sex Delivery Anes PTL Lv   1 Current              Past Medical History:   Past Medical History:   Diagnosis Date    Iron deficiency      Past Social History: No past surgical history on file.  Family History:   Family History   Problem Relation Age of Onset    No Known Problems Father     No Known Problems Mother     Hypertension Paternal Grandmother     High Cholesterol Paternal Grandmother      Social History:   Social History     Tobacco Use    Smoking status: Never    Smokeless tobacco: Never   Substance Use Topics    Alcohol use: Not Currently       Home Meds:   Medications Prior to Admission   Medication Sig Dispense Refill Last Dose    metFORMIN 500 MG Oral Tab Take 1 tablet (500 mg total) by mouth every evening. 30 tablet 3 2024    prenatal vitamin with DHA 27-0.8-228 MG Oral Cap Take 1 capsule by mouth daily.   2024     Allergies:  Not on File    OBJECTIVE:    Temp:  [98 °F (36.7 °C)] 98 °F (36.7 °C)  Pulse:  [90-94] 90  BP: (139-148)/(79-83) 147/83  There is no height or weight on file to calculate BMI.    General: AAO. NAD.   Lungs: Respirations non labored   CV: peripheral pulses +2 bilaterally   Abdomen: soft, nontender, nondistended, no abnormal masses, no epigastric pain and FHT present, gravid   Extremities: negative edema bilaterally, negative calf tenderness bilaterally    FHT: moderate variability/150s BPM / Positive accelerations/Negative decelerations   TOCO: q 2-3 minutes    SVE: 0 / 40 / -2 per RN on admission     [place]: cephalic    Prenatal Labs Brief Review   Blood Type:   Lab Results   Component Value Date    ABO O 01/10/2024    RH Positive 01/10/2024     GBS:  Negative      Inpatient labs:  Lab Results   Component Value Date    WBC 8.5 01/10/2024    HGB 9.8 01/10/2024    HCT 31.6 01/10/2024    .0 01/10/2024    CREATSERUM 0.72 01/10/2024    BUN 7 01/10/2024     01/10/2024    K 4.1 01/10/2024     01/10/2024    CO2 24.0 01/10/2024    GLU 97 01/10/2024    CA 9.4 01/10/2024    ALB 3.8 01/10/2024    ALKPHO 178 01/10/2024    BILT 0.3 01/10/2024    TP 6.7 01/10/2024    AST 26 01/10/2024    ALT 15 01/10/2024       ASSESSMENT/ PLAN:    Marisa Borrero is a 23 year old  female at 39w6d Estimated Date of Delivery: 24 who is being admitted for induction of labor.    Patient Active Problem List   Diagnosis    Anxiety    History of subarachnoid hemorrhage    History of traumatic brain injury    History of syncope    Oral hypoglycemic controlled White classification A2 gestational diabetes mellitus (GDM)    Itching    Pregnancy       1. Induction of Labor:   - admit with routine labs  - vaginal cytotec q 3 hrs  2. Fetal monitoring: CEFM  3. GBS: negative  4. Pain: mediction / epidural as requested    Risks, benefits, alternatives and possible complications have been discussed in detail with the patient.   Pre-admission, admission, and post admission procedures and expectations were discussed in detail.  All questions answered, all appropriate consents signed at the Hospital. Admission is planned for today.     DO AMEYA Pennington

## 2024-01-11 NOTE — ANESTHESIA PREPROCEDURE EVALUATION
Anesthesia PreOp Note    HPI:     Marisa Borrero is a 23 year old female who presents for preoperative consultation requested by: * Surgery not found *    Date of Surgery:       Indication: * No surgery found *    Relevant Problems   No relevant active problems       NPO:                         History Review:  Patient Active Problem List    Diagnosis Date Noted    Pregnancy 01/10/2024    Itching 01/01/2024    Oral hypoglycemic controlled White classification A2 gestational diabetes mellitus (GDM) 12/19/2023    History of subarachnoid hemorrhage 11/14/2023    History of syncope 11/14/2023    History of traumatic brain injury 03/21/2022    Anxiety 09/20/2018       Past Medical History:   Diagnosis Date    Iron deficiency        No past surgical history on file.    Medications Prior to Admission   Medication Sig Dispense Refill Last Dose    metFORMIN 500 MG Oral Tab Take 1 tablet (500 mg total) by mouth every evening. 30 tablet 3 1/9/2024    prenatal vitamin with DHA 27-0.8-228 MG Oral Cap Take 1 capsule by mouth daily.   1/9/2024     Current Facility-Administered Medications Ordered in Epic   Medication Dose Route Frequency Provider Last Rate Last Admin    oxyTOCIN in sodium chloride 0.9% (Pitocin) 30 Units/500mL infusion premix  0.5-20 ilir-units/min Intravenous Continuous Meg Zuniga DO 8 mL/hr at 01/11/24 1218 8 ilir-units/min at 01/11/24 1218    lactated ringers IV bolus 1,000 mL  1,000 mL Intravenous Once Dulce Chung MD 2,000 mL/hr at 01/11/24 1216 Rate Change at 01/11/24 1216    fentaNYL-bupivacaine 2 mcg/mL-0.125% in sodium chloride 0.9% 100 mL EPIDURAL infusion premix   Epidural Continuous Dulce Chung MD        fentaNYL (Sublimaze) 50 mcg/mL injection 100 mcg  100 mcg Epidural Once Dulce Chung MD        bupivacaine PF (Marcaine) 0.25% injection  20 mL Epidural Once Dulce Chung MD        EPHEDrine (PF) 25 MG/5 ML injection 5 mg  5 mg Intravenous PRN Dulce Chung MD         nalbuphine (Nubain) 10 mg/mL injection 2.5 mg  2.5 mg Intravenous Q15 Min PRN Dulce Chung MD        acetaminophen (Tylenol Extra Strength) tab 500 mg  500 mg Oral Q6H PRN Dubyel, Meg T, DO        acetaminophen (Tylenol Extra Strength) tab 1,000 mg  1,000 mg Oral Q6H PRN Dubyel, Meg T, DO        ibuprofen (Motrin) tab 600 mg  600 mg Oral Once PRN Dubyel, Meg T, DO        ondansetron (Zofran) 4 MG/2ML injection 4 mg  4 mg Intravenous Q6H PRN Dubyel, Meg T, DO        oxyTOCIN in sodium chloride 0.9% (Pitocin) 30 Units/500mL infusion premix  62.5-900 ilir-units/min Intravenous Continuous Dubyel, Meg T, DO        terbutaline (Brethine) 1 MG/ML injection 0.25 mg  0.25 mg Subcutaneous PRN Dubyel, Meg T, DO        sodium citrate-citric acid (Bicitra) 500-334 MG/5ML oral solution 30 mL  30 mL Oral PRN Dubyel, Meg T, DO        lidocaine PF (Xylocaine-MPF) 1% injection  30 mL Intradermal Once Dubyel, Meg T, DO        lactated ringers infusion   Intravenous Continuous Dubyel, Meg T, DO   Paused at 01/11/24 1216    dextrose in lactated ringers 5% infusion   Intravenous PRN Dubyel, Meg T, DO        lactated ringers IV bolus 500 mL  500 mL Intravenous PRN Dubyel, Meg T, DO        nalbuphine (Nubain) 10 mg/mL injection 10 mg  10 mg Intramuscular Q6H PRN Dubyel, Meg T, DO        And    hydrOXYzine 50 mg/mL injection 50 mg  50 mg Intramuscular Q6H PRN Dubyel, Meg T, DO        fentaNYL (Sublimaze) 50 mcg/mL injection 100 mcg  100 mcg Intravenous Once Dubyel, Meg T, DO        fentaNYL (Sublimaze) 50 mcg/mL injection 50 mcg  50 mcg Intravenous Q30 Min PRN Dubyel, Meg T, DO        calcium carbonate (Tums) chewable tab 1,000 mg  1,000 mg Oral Q4H PRN Dubyel, Meg T, DO         No current Epic-ordered outpatient medications on file.       No Known Allergies    Family History   Problem Relation Age of Onset    No Known Problems Father     No Known Problems Mother     Hypertension Paternal Grandmother     High  Cholesterol Paternal Grandmother      Social History     Socioeconomic History    Marital status:    Tobacco Use    Smoking status: Never    Smokeless tobacco: Never   Vaping Use    Vaping Use: Never used   Substance and Sexual Activity    Alcohol use: Not Currently    Drug use: Never   Other Topics Concern    Caffeine Concern No    Special Diet No       Available pre-op labs reviewed.  Lab Results   Component Value Date    WBC 8.5 01/10/2024    RBC 4.15 01/10/2024    HGB 9.8 (L) 01/10/2024    HCT 31.6 (L) 01/10/2024    MCV 76.1 (L) 01/10/2024    MCH 23.6 (L) 01/10/2024    MCHC 31.0 01/10/2024    RDW 16.6 (H) 01/10/2024    .0 01/10/2024     Lab Results   Component Value Date     (L) 01/10/2024    K 4.1 01/10/2024     01/10/2024    CO2 24.0 01/10/2024    BUN 7 (L) 01/10/2024    CREATSERUM 0.72 01/10/2024    GLU 97 01/10/2024    PGLU 96 01/11/2024    CA 9.4 01/10/2024          Vital Signs:  Body mass index is 33.12 kg/m².   height is 1.651 m (5' 5\") and weight is 90.3 kg (199 lb). Her oral temperature is 97.6 °F (36.4 °C). Her blood pressure is 142/83 and her pulse is 103. Her oxygen saturation is 100%.   Vitals:    01/11/24 0930 01/11/24 1130 01/11/24 1313 01/11/24 1315   BP:  (!) 142/92 134/82 142/83   Pulse:  88 96 103   Temp: 97.8 °F (36.6 °C) 97.6 °F (36.4 °C)     TempSrc: Oral Oral     SpO2:    100%   Weight:       Height:            Anesthesia Evaluation      Airway   Mallampati: I  TM distance: >3 FB  Neck ROM: full  Dental      Pulmonary - normal exam   Cardiovascular - normal exam    Neuro/Psych    (+)  anxiety/panic attacks,        GI/Hepatic/Renal      Endo/Other    (+) diabetes mellitus  Abdominal   (+) obese                 Anesthesia Plan:   ASA:  2  Emergent    Plan:   Epidural  Informed Consent Plan and Risks Discussed With:  Patient      I have informed Marisa Vegas and/or legal guardian or family member of the nature of the anesthetic plan, benefits, risks including  possible dental damage if relevant, major complications, and any alternative forms of anesthetic management.   All of the patient's questions were answered to the best of my ability. The patient desires the anesthetic management as planned.  HANNA TOUSSAINT MD  1/11/2024 1:19 PM  Present on Admission:   Pregnancy

## 2024-01-11 NOTE — ANESTHESIA PROCEDURE NOTES
Labor Analgesia    Date/Time: 1/11/2024 1:05 PM    Performed by: Dulce Chung MD  Authorized by: Dulce Chung MD      General Information and Staff    Start Time:  1/11/2024 1:05 PM  End Time:  1/11/2024 1:20 PM  Anesthesiologist:  Dulce Chung MD  Performed by:  Anesthesiologist  Patient Location:  OB  Reason for Block: labor epidural    Preanesthetic Checklist: patient identified, IV checked, site marked, risks and benefits discussed, monitors and equipment checked, pre-op evaluation, timeout performed, anesthesia consent and sterile technique used      Procedure Details    Patient Position:  Sitting  Prep: ChloraPrep    Monitoring:  Heart rate  Approach:  Midline    Epidural Needle    Injection Technique:  YUMI saline  Needle Type:  Tuohy  Needle Gauge:  18 G  Needle Length:  3.5 in  Needle Insertion Depth:  6  Location:  L2-3    Spinal Needle      Catheter    Catheter Type:  Multi-orifice  Catheter Size:  20 G  Catheter at Skin Depth:  11  Test Dose:  Negative    Assessment    Sensory Level:  T10    Additional Comments

## 2024-01-12 PROBLEM — O24.415 GESTATIONAL DIABETES MELLITUS (GDM) IN THIRD TRIMESTER CONTROLLED ON ORAL HYPOGLYCEMIC DRUG: Status: ACTIVE | Noted: 2023-12-19

## 2024-01-12 PROBLEM — O24.415 GESTATIONAL DIABETES MELLITUS (GDM) IN THIRD TRIMESTER CONTROLLED ON ORAL HYPOGLYCEMIC DRUG (HCC): Status: ACTIVE | Noted: 2023-12-19

## 2024-01-12 PROBLEM — D62 ACUTE BLOOD LOSS ANEMIA (ABLA): Status: ACTIVE | Noted: 2024-01-12

## 2024-01-12 PROBLEM — O61.9 FAILED INDUCTION OF LABOR: Status: ACTIVE | Noted: 2024-01-12

## 2024-01-12 PROBLEM — O61.9 FAILED INDUCTION OF LABOR (HCC): Status: ACTIVE | Noted: 2024-01-12

## 2024-01-12 LAB
BASOPHILS # BLD AUTO: 0.03 X10(3) UL (ref 0–0.2)
BASOPHILS NFR BLD AUTO: 0.2 %
DEPRECATED RDW RBC AUTO: 45.7 FL (ref 35.1–46.3)
EOSINOPHIL # BLD AUTO: 0.58 X10(3) UL (ref 0–0.7)
EOSINOPHIL NFR BLD AUTO: 3.3 %
ERYTHROCYTE [DISTWIDTH] IN BLOOD BY AUTOMATED COUNT: 16.3 % (ref 11–15)
GLUCOSE BLDC GLUCOMTR-MCNC: 80 MG/DL (ref 70–99)
GLUCOSE BLDC GLUCOMTR-MCNC: 84 MG/DL (ref 70–99)
GLUCOSE BLDC GLUCOMTR-MCNC: 86 MG/DL (ref 70–99)
GLUCOSE BLDC GLUCOMTR-MCNC: 89 MG/DL (ref 70–99)
HCT VFR BLD AUTO: 23.4 %
HGB BLD-MCNC: 7.1 G/DL
IMM GRANULOCYTES # BLD AUTO: 0.1 X10(3) UL (ref 0–1)
IMM GRANULOCYTES NFR BLD: 0.6 %
LYMPHOCYTES # BLD AUTO: 0.95 X10(3) UL (ref 1–4)
LYMPHOCYTES NFR BLD AUTO: 5.4 %
MCH RBC QN AUTO: 23.5 PG (ref 26–34)
MCHC RBC AUTO-ENTMCNC: 30.3 G/DL (ref 31–37)
MCV RBC AUTO: 77.5 FL
MONOCYTES # BLD AUTO: 1 X10(3) UL (ref 0.1–1)
MONOCYTES NFR BLD AUTO: 5.6 %
NEUTROPHILS # BLD AUTO: 15.06 X10 (3) UL (ref 1.5–7.7)
NEUTROPHILS # BLD AUTO: 15.06 X10(3) UL (ref 1.5–7.7)
NEUTROPHILS NFR BLD AUTO: 84.9 %
PLATELET # BLD AUTO: 216 10(3)UL (ref 150–450)
RBC # BLD AUTO: 3.02 X10(6)UL
WBC # BLD AUTO: 17.7 X10(3) UL (ref 4–11)

## 2024-01-12 PROCEDURE — 59514 CESAREAN DELIVERY ONLY: CPT | Performed by: OBSTETRICS & GYNECOLOGY

## 2024-01-12 PROCEDURE — 10H07YZ INSERTION OF OTHER DEVICE INTO PRODUCTS OF CONCEPTION, VIA NATURAL OR ARTIFICIAL OPENING: ICD-10-PCS | Performed by: OBSTETRICS & GYNECOLOGY

## 2024-01-12 PROCEDURE — 59515 CESAREAN DELIVERY: CPT | Performed by: OBSTETRICS & GYNECOLOGY

## 2024-01-12 PROCEDURE — 30233N1 TRANSFUSION OF NONAUTOLOGOUS RED BLOOD CELLS INTO PERIPHERAL VEIN, PERCUTANEOUS APPROACH: ICD-10-PCS | Performed by: OBSTETRICS & GYNECOLOGY

## 2024-01-12 RX ORDER — TRANEXAMIC ACID 10 MG/ML
INJECTION, SOLUTION INTRAVENOUS
Status: DISPENSED
Start: 2024-01-12 | End: 2024-01-13

## 2024-01-12 RX ORDER — HYDROMORPHONE HYDROCHLORIDE 1 MG/ML
0.6 INJECTION, SOLUTION INTRAMUSCULAR; INTRAVENOUS; SUBCUTANEOUS
Status: ACTIVE | OUTPATIENT
Start: 2024-01-12 | End: 2024-01-13

## 2024-01-12 RX ORDER — ACETAMINOPHEN 325 MG/1
650 TABLET ORAL EVERY 6 HOURS PRN
Status: ACTIVE | OUTPATIENT
Start: 2024-01-12 | End: 2024-01-13

## 2024-01-12 RX ORDER — HYDROCODONE BITARTRATE AND ACETAMINOPHEN 7.5; 325 MG/1; MG/1
1 TABLET ORAL EVERY 6 HOURS PRN
Status: ACTIVE | OUTPATIENT
Start: 2024-01-12 | End: 2024-01-13

## 2024-01-12 RX ORDER — NALBUPHINE HYDROCHLORIDE 10 MG/ML
2.5 INJECTION, SOLUTION INTRAMUSCULAR; INTRAVENOUS; SUBCUTANEOUS
Status: DISCONTINUED | OUTPATIENT
Start: 2024-01-12 | End: 2024-01-15

## 2024-01-12 RX ORDER — DIPHENHYDRAMINE HCL 25 MG
25 CAPSULE ORAL EVERY 4 HOURS PRN
Status: ACTIVE | OUTPATIENT
Start: 2024-01-12 | End: 2024-01-13

## 2024-01-12 RX ORDER — FAMOTIDINE 20 MG/1
20 TABLET, FILM COATED ORAL ONCE
Status: COMPLETED | OUTPATIENT
Start: 2024-01-12 | End: 2024-01-12

## 2024-01-12 RX ORDER — FAMOTIDINE 10 MG/ML
INJECTION, SOLUTION INTRAVENOUS
Status: COMPLETED
Start: 2024-01-12 | End: 2024-01-12

## 2024-01-12 RX ORDER — DIPHENHYDRAMINE HYDROCHLORIDE 50 MG/ML
12.5 INJECTION INTRAMUSCULAR; INTRAVENOUS EVERY 4 HOURS PRN
Status: ACTIVE | OUTPATIENT
Start: 2024-01-12 | End: 2024-01-13

## 2024-01-12 RX ORDER — KETOROLAC TROMETHAMINE 30 MG/ML
30 INJECTION, SOLUTION INTRAMUSCULAR; INTRAVENOUS ONCE
Status: COMPLETED | OUTPATIENT
Start: 2024-01-12 | End: 2024-01-12

## 2024-01-12 RX ORDER — CARBOPROST TROMETHAMINE 250 UG/ML
INJECTION, SOLUTION INTRAMUSCULAR AS NEEDED
Status: DISCONTINUED | OUTPATIENT
Start: 2024-01-12 | End: 2024-01-12 | Stop reason: SURG

## 2024-01-12 RX ORDER — PROCHLORPERAZINE EDISYLATE 5 MG/ML
5 INJECTION INTRAMUSCULAR; INTRAVENOUS ONCE AS NEEDED
Status: ACTIVE | OUTPATIENT
Start: 2024-01-12 | End: 2024-01-12

## 2024-01-12 RX ORDER — CALCIUM CARBONATE 500 MG/1
1000 TABLET, CHEWABLE ORAL
Status: DISCONTINUED | OUTPATIENT
Start: 2024-01-12 | End: 2024-01-12

## 2024-01-12 RX ORDER — NALOXONE HYDROCHLORIDE 0.4 MG/ML
0.08 INJECTION, SOLUTION INTRAMUSCULAR; INTRAVENOUS; SUBCUTANEOUS
Status: ACTIVE | OUTPATIENT
Start: 2024-01-12 | End: 2024-01-13

## 2024-01-12 RX ORDER — LIDOCAINE HCL/EPINEPHRINE/PF 2%-1:200K
VIAL (ML) INJECTION AS NEEDED
Status: DISCONTINUED | OUTPATIENT
Start: 2024-01-12 | End: 2024-01-12 | Stop reason: SURG

## 2024-01-12 RX ORDER — METOCLOPRAMIDE HYDROCHLORIDE 5 MG/ML
10 INJECTION INTRAMUSCULAR; INTRAVENOUS ONCE
Status: COMPLETED | OUTPATIENT
Start: 2024-01-12 | End: 2024-01-12

## 2024-01-12 RX ORDER — METOCLOPRAMIDE 10 MG/1
10 TABLET ORAL ONCE
Status: COMPLETED | OUTPATIENT
Start: 2024-01-12 | End: 2024-01-12

## 2024-01-12 RX ORDER — CEFAZOLIN SODIUM/WATER 2 G/20 ML
2 SYRINGE (ML) INTRAVENOUS ONCE
Status: COMPLETED | OUTPATIENT
Start: 2024-01-12 | End: 2024-01-12

## 2024-01-12 RX ORDER — AMMONIA INHALANTS 0.04 G/.3ML
0.3 INHALANT RESPIRATORY (INHALATION) AS NEEDED
Status: DISCONTINUED | OUTPATIENT
Start: 2024-01-12 | End: 2024-01-15

## 2024-01-12 RX ORDER — KETOROLAC TROMETHAMINE 30 MG/ML
30 INJECTION, SOLUTION INTRAMUSCULAR; INTRAVENOUS EVERY 6 HOURS
Status: DISPENSED | OUTPATIENT
Start: 2024-01-12 | End: 2024-01-13

## 2024-01-12 RX ORDER — SODIUM CHLORIDE 9 MG/ML
INJECTION, SOLUTION INTRAVENOUS ONCE
Status: DISCONTINUED | OUTPATIENT
Start: 2024-01-12 | End: 2024-01-15

## 2024-01-12 RX ORDER — DOCUSATE SODIUM 100 MG/1
100 CAPSULE, LIQUID FILLED ORAL
Status: DISCONTINUED | OUTPATIENT
Start: 2024-01-12 | End: 2024-01-15

## 2024-01-12 RX ORDER — METHYLERGONOVINE MALEATE 0.2 MG/ML
0.2 INJECTION INTRAVENOUS ONCE AS NEEDED
Status: ACTIVE | OUTPATIENT
Start: 2024-01-12 | End: 2024-01-12

## 2024-01-12 RX ORDER — TRANEXAMIC ACID 10 MG/ML
INJECTION, SOLUTION INTRAVENOUS AS NEEDED
Status: DISCONTINUED | OUTPATIENT
Start: 2024-01-12 | End: 2024-01-12 | Stop reason: SURG

## 2024-01-12 RX ORDER — SIMETHICONE 80 MG
80 TABLET,CHEWABLE ORAL 3 TIMES DAILY PRN
Status: DISCONTINUED | OUTPATIENT
Start: 2024-01-12 | End: 2024-01-15

## 2024-01-12 RX ORDER — METOCLOPRAMIDE HYDROCHLORIDE 5 MG/ML
INJECTION INTRAMUSCULAR; INTRAVENOUS
Status: COMPLETED
Start: 2024-01-12 | End: 2024-01-12

## 2024-01-12 RX ORDER — ACETAMINOPHEN 500 MG
1000 TABLET ORAL EVERY 6 HOURS
Status: DISCONTINUED | OUTPATIENT
Start: 2024-01-12 | End: 2024-01-15

## 2024-01-12 RX ORDER — SODIUM CHLORIDE, SODIUM LACTATE, POTASSIUM CHLORIDE, CALCIUM CHLORIDE 600; 310; 30; 20 MG/100ML; MG/100ML; MG/100ML; MG/100ML
INJECTION, SOLUTION INTRAVENOUS CONTINUOUS PRN
Status: DISCONTINUED | OUTPATIENT
Start: 2024-01-12 | End: 2024-01-12 | Stop reason: SURG

## 2024-01-12 RX ORDER — TRANEXAMIC ACID 10 MG/ML
1000 INJECTION, SOLUTION INTRAVENOUS ONCE AS NEEDED
Status: ACTIVE | OUTPATIENT
Start: 2024-01-12 | End: 2024-01-12

## 2024-01-12 RX ORDER — ENOXAPARIN SODIUM 100 MG/ML
40 INJECTION SUBCUTANEOUS DAILY
Status: DISCONTINUED | OUTPATIENT
Start: 2024-01-13 | End: 2024-01-15

## 2024-01-12 RX ORDER — IBUPROFEN 600 MG/1
600 TABLET ORAL EVERY 6 HOURS
Status: DISCONTINUED | OUTPATIENT
Start: 2024-01-13 | End: 2024-01-15

## 2024-01-12 RX ORDER — HYDROCODONE BITARTRATE AND ACETAMINOPHEN 7.5; 325 MG/1; MG/1
2 TABLET ORAL EVERY 6 HOURS PRN
Status: ACTIVE | OUTPATIENT
Start: 2024-01-12 | End: 2024-01-13

## 2024-01-12 RX ORDER — NALBUPHINE HYDROCHLORIDE 10 MG/ML
2.5 INJECTION, SOLUTION INTRAMUSCULAR; INTRAVENOUS; SUBCUTANEOUS EVERY 4 HOURS PRN
Status: ACTIVE | OUTPATIENT
Start: 2024-01-12 | End: 2024-01-13

## 2024-01-12 RX ORDER — MISOPROSTOL 200 UG/1
TABLET ORAL
Status: DISPENSED
Start: 2024-01-12 | End: 2024-01-13

## 2024-01-12 RX ORDER — HALOPERIDOL 5 MG/ML
0.5 INJECTION INTRAMUSCULAR ONCE AS NEEDED
Status: ACTIVE | OUTPATIENT
Start: 2024-01-12 | End: 2024-01-12

## 2024-01-12 RX ORDER — ONDANSETRON 2 MG/ML
4 INJECTION INTRAMUSCULAR; INTRAVENOUS ONCE AS NEEDED
Status: ACTIVE | OUTPATIENT
Start: 2024-01-12 | End: 2024-01-12

## 2024-01-12 RX ORDER — CARBOPROST TROMETHAMINE 250 UG/ML
INJECTION, SOLUTION INTRAMUSCULAR
Status: DISPENSED
Start: 2024-01-12 | End: 2024-01-13

## 2024-01-12 RX ORDER — SODIUM CHLORIDE, SODIUM LACTATE, POTASSIUM CHLORIDE, CALCIUM CHLORIDE 600; 310; 30; 20 MG/100ML; MG/100ML; MG/100ML; MG/100ML
INJECTION, SOLUTION INTRAVENOUS CONTINUOUS
Status: DISCONTINUED | OUTPATIENT
Start: 2024-01-12 | End: 2024-01-15

## 2024-01-12 RX ORDER — GABAPENTIN 300 MG/1
300 CAPSULE ORAL EVERY 8 HOURS
Status: DISCONTINUED | OUTPATIENT
Start: 2024-01-12 | End: 2024-01-15

## 2024-01-12 RX ORDER — HYDROMORPHONE HYDROCHLORIDE 1 MG/ML
0.4 INJECTION, SOLUTION INTRAMUSCULAR; INTRAVENOUS; SUBCUTANEOUS
Status: DISPENSED | OUTPATIENT
Start: 2024-01-12 | End: 2024-01-13

## 2024-01-12 RX ORDER — ONDANSETRON 2 MG/ML
4 INJECTION INTRAMUSCULAR; INTRAVENOUS EVERY 6 HOURS PRN
Status: DISCONTINUED | OUTPATIENT
Start: 2024-01-12 | End: 2024-01-15

## 2024-01-12 RX ORDER — CEFAZOLIN SODIUM/WATER 2 G/20 ML
SYRINGE (ML) INTRAVENOUS
Status: DISPENSED
Start: 2024-01-12 | End: 2024-01-13

## 2024-01-12 RX ORDER — BISACODYL 10 MG
10 SUPPOSITORY, RECTAL RECTAL ONCE AS NEEDED
Status: DISCONTINUED | OUTPATIENT
Start: 2024-01-12 | End: 2024-01-15

## 2024-01-12 RX ORDER — MORPHINE SULFATE 1 MG/ML
INJECTION, SOLUTION EPIDURAL; INTRATHECAL; INTRAVENOUS AS NEEDED
Status: DISCONTINUED | OUTPATIENT
Start: 2024-01-12 | End: 2024-01-12 | Stop reason: SURG

## 2024-01-12 RX ORDER — FAMOTIDINE 10 MG/ML
20 INJECTION, SOLUTION INTRAVENOUS ONCE
Status: COMPLETED | OUTPATIENT
Start: 2024-01-12 | End: 2024-01-12

## 2024-01-12 RX ADMIN — SODIUM CHLORIDE, SODIUM LACTATE, POTASSIUM CHLORIDE, CALCIUM CHLORIDE: 600; 310; 30; 20 INJECTION, SOLUTION INTRAVENOUS at 17:02:00

## 2024-01-12 RX ADMIN — LIDOCAINE HCL/EPINEPHRINE/PF 5 ML: 2%-1:200K VIAL (ML) INJECTION at 17:04:00

## 2024-01-12 RX ADMIN — CARBOPROST TROMETHAMINE 250 MCG: 250 INJECTION, SOLUTION INTRAMUSCULAR at 17:22:00

## 2024-01-12 RX ADMIN — TRANEXAMIC ACID 1000 MG: 10 INJECTION, SOLUTION INTRAVENOUS at 17:20:00

## 2024-01-12 RX ADMIN — CEFAZOLIN SODIUM/WATER 2 G: 2 G/20 ML SYRINGE (ML) INTRAVENOUS at 17:07:00

## 2024-01-12 RX ADMIN — LIDOCAINE HCL/EPINEPHRINE/PF 10 ML: 2%-1:200K VIAL (ML) INJECTION at 17:08:00

## 2024-01-12 RX ADMIN — SODIUM CHLORIDE, SODIUM LACTATE, POTASSIUM CHLORIDE, CALCIUM CHLORIDE: 600; 310; 30; 20 INJECTION, SOLUTION INTRAVENOUS at 17:43:00

## 2024-01-12 RX ADMIN — LIDOCAINE HCL/EPINEPHRINE/PF 5 ML: 2%-1:200K VIAL (ML) INJECTION at 17:07:00

## 2024-01-12 RX ADMIN — MORPHINE SULFATE 3 MG: 1 INJECTION, SOLUTION EPIDURAL; INTRATHECAL; INTRAVENOUS at 17:39:00

## 2024-01-12 NOTE — PROGRESS NOTES
Labor progress note    Patient doing well, feeling rectal pressure with contractions.    Vitals:    01/12/24 1145   BP: 139/71   Pulse: 104   Resp:    Temp:      FHT: baseline 150s, moderate variability, + accelerations, no decelerations  [place]: q 6 min    Cervix: 5/100/0, direct OP by palpation, IUPC inserted    A/P:  24 yo G1 @ 40w0d, IOL    - pitocin currently at 16 units, IUPC inserted.  - will try position changes to encourage better fetal head position.  - Category 1 FHT    Meg Zuniga, DO

## 2024-01-12 NOTE — PLAN OF CARE
Problem: BIRTH - VAGINAL/ SECTION  Goal: Fetal and maternal status remain reassuring during the birth process  Description: INTERVENTIONS:  - Monitor vital signs  - Monitor fetal heart rate  - Monitor uterine activity  - Monitor labor progression (vaginal delivery)  - DVT prophylaxis (C/S delivery)  - Surgical antibiotic prophylaxis (C/S delivery)  Outcome: Progressing     Problem: PAIN - ADULT  Goal: Verbalizes/displays adequate comfort level or patient's stated pain goal  Description: INTERVENTIONS:  - Encourage pt to monitor pain and request assistance  - Assess pain using appropriate pain scale  - Administer analgesics based on type and severity of pain and evaluate response  - Implement non-pharmacological measures as appropriate and evaluate response  - Consider cultural and social influences on pain and pain management  - Manage/alleviate anxiety  - Utilize distraction and/or relaxation techniques  - Monitor for opioid side effects  - Notify MD/LIP if interventions unsuccessful or patient reports new pain  - Anticipate increased pain with activity and pre-medicate as appropriate  Outcome: Progressing     Problem: ANXIETY  Goal: Will report anxiety at manageable levels  Description: INTERVENTIONS:  - Administer medication as ordered  - Teach and rehearse alternative coping skills  - Provide emotional support with 1:1 interaction with staff  Outcome: Progressing     Problem: Patient Centered Care  Goal: Patient preferences are identified and integrated in the patient's plan of care  Description: Interventions:  - What would you like us to know as we care for you?   - Provide timely, complete, and accurate information to patient/family  - Incorporate patient and family knowledge, values, beliefs, and cultural backgrounds into the planning and delivery of care  - Encourage patient/family to participate in care and decision-making at the level they choose  - Honor patient and family perspectives and  choices  Outcome: Progressing     Problem: Patient/Family Goals  Goal: Patient/Family Long Term Goal  Description: Patient's Long Term Goal:     Interventions:  -   - See additional Care Plan goals for specific interventions  Outcome: Progressing  Goal: Patient/Family Short Term Goal  Description: Patient's Short Term Goal:     Interventions:   -   - See additional Care Plan goals for specific interventions  Outcome: Progressing

## 2024-01-12 NOTE — PROGRESS NOTES
Labor progress note    Patient doing well, feeling rectal pressure with contractions.    Vitals:    01/12/24 0715   BP: 144/73   Pulse:    Resp:    Temp:      FHT: baseline 150s, moderate variability, + accelerations, no decelerations  [place]: q 4 min    Cervix: 5/100/-1 per RN    A/P:  24 yo G1 @ 40w0d, IOL    Pitocin at 30, will turn off for a \"pit-break\" for 1 hour, and restart at 0  Plan to insert IUPC at next exam  Category 1 FHT    Meg Zuniga,

## 2024-01-12 NOTE — PROGRESS NOTES
Decision for .    Patient examined and exam unchanged since last exam with IUPC insertion.  Cervix: 5.5/100/0  Category 1 FHT    Has been ruptured >24 hours, with no cervical change in the last 8 hours.    Recommend primary  section.  Risks of surgery reviewed, including pain, bleeding, infection, injury to surrounding structures (bowel, bladder, tubes, ovaries), injury to fetus. All questions answered. Patient agrees to plan,    Proceed to OR when ready.    Meg Zuniga, DO

## 2024-01-12 NOTE — PROGRESS NOTES
Patient c/io painful contractions.     Patient Vitals for the past 24 hrs:   BP Temp Temp src Pulse SpO2 Height Weight   01/11/24 1730 134/80 97.4 °F (36.3 °C) Oral 93 100 % -- --   01/11/24 1715 134/85 -- -- 89 100 % -- --   01/11/24 1700 141/85 -- -- 98 100 % -- --   01/11/24 1645 132/86 -- -- 94 99 % -- --   01/11/24 1630 124/74 -- -- 92 97 % -- --   01/11/24 1615 129/80 -- -- 92 100 % -- --   01/11/24 1600 133/66 -- -- 95 100 % -- --   01/11/24 1545 119/68 -- -- 94 100 % -- --   01/11/24 1530 112/63 97.6 °F (36.4 °C) Oral 91 99 % -- --   01/11/24 1515 121/59 -- -- 104 99 % -- --   01/11/24 1500 117/62 -- -- 93 96 % -- --   01/11/24 1415 112/48 -- -- 101 96 % -- --   01/11/24 1400 123/62 -- -- 103 96 % -- --   01/11/24 1345 121/62 -- -- 111 98 % -- --   01/11/24 1330 123/63 97.9 °F (36.6 °C) Oral 98 96 % -- --   01/11/24 1325 126/61 -- -- 100 97 % -- --   01/11/24 1321 130/64 -- -- 105 -- -- --   01/11/24 1318 133/72 -- -- 90 -- -- --   01/11/24 1315 142/83 -- -- 103 100 % -- --   01/11/24 1313 134/82 -- -- 96 -- -- --   01/11/24 1130 (!) 142/92 97.6 °F (36.4 °C) Oral 88 -- -- --   01/11/24 0930 -- 97.8 °F (36.6 °C) Oral -- -- -- --   01/11/24 0730 132/83 97.3 °F (36.3 °C) Oral 82 -- 65\" 199 lb (90.3 kg)   01/11/24 0415 -- 98.5 °F (36.9 °C) Oral -- -- -- --   01/11/24 0345 133/69 -- -- 98 -- -- --   01/11/24 0130 147/83 98 °F (36.7 °C) Oral 90 -- -- --   01/10/24 2215 139/79 98 °F (36.7 °C) Oral 93 -- -- --   01/10/24 2130 148/82 -- -- 94 -- -- --     SVE 2.5/70/-2    Component      Latest Ref Rn 1/10/2024   WBC      4.0 - 11.0 x10(3) uL 8.5    RBC      3.80 - 5.30 x10(6)uL 4.15    Hemoglobin      12.0 - 16.0 g/dL 9.8 (L)    Hematocrit      35.0 - 48.0 % 31.6 (L)    MCV      80.0 - 100.0 fL 76.1 (L)    MCH      26.0 - 34.0 pg 23.6 (L)    MCHC      31.0 - 37.0 g/dL 31.0    RDW-SD      35.1 - 46.3 fL 45.1    RDW      11.0 - 15.0 % 16.6 (H)    Platelet Count      150.0 - 450.0 10(3)uL 318.0    Prelim Neutrophil  Abs      1.50 - 7.70 x10 (3) uL 5.76    Neutrophils Absolute      1.50 - 7.70 x10(3) uL 5.76    Lymphocytes Absolute      1.00 - 4.00 x10(3) uL 1.52    Monocytes Absolute      0.10 - 1.00 x10(3) uL 0.45    Eosinophils Absolute      0.00 - 0.70 x10(3) uL 0.71 (H)    Basophils Absolute      0.00 - 0.20 x10(3) uL 0.02    Immature Granulocyte Absolute      0.00 - 1.00 x10(3) uL 0.03    Neutrophils %      % 67.8    Lymphocytes %      % 17.9    Monocytes %      % 5.3    Eosinophils %      % 8.4    Basophils %      % 0.2    Immature Granulocyte %      % 0.4    Glucose      70 - 99 mg/dL 97    Sodium      136 - 145 mmol/L 134 (L)    Potassium      3.5 - 5.1 mmol/L 4.1    Chloride      98 - 112 mmol/L 104    Carbon Dioxide, Total      21.0 - 32.0 mmol/L 24.0    ANION GAP      0 - 18 mmol/L 6    BUN      9 - 23 mg/dL 7 (L)    CREATININE      0.55 - 1.02 mg/dL 0.72    BUN/CREATININE RATIO      10.0 - 20.0  9.7 (L)    CALCIUM      8.7 - 10.4 mg/dL 9.4    CALCULATED OSMOLALITY      275 - 295 mOsm/kg 276    EGFR      >=60 mL/min/1.73m2 120    ALT (SGPT)      10 - 49 U/L 15    AST (SGOT)      <=34 U/L 26    ALKALINE PHOSPHATASE      37 - 98 U/L 178 (H)    Total Bilirubin      0.3 - 1.2 mg/dL 0.3    PROTEIN, TOTAL      5.7 - 8.2 g/dL 6.7    Albumin      3.2 - 4.8 g/dL 3.8    Globulin      2.8 - 4.4 g/dL 2.9    A/G Ratio      1.0 - 2.0  1.3    TOTAL PROTEIN URINE RANDOM      <14.0 mg/dL 105.0 (H)    CREATININE UR RANDOM      mg/dL 123.00    Urine Protein/Creatinine Ratio, Random 0.85    ABO BLOOD TYPE O    RH Factor Positive    ANTIBODY SCREEN Negative    POC GLUCOSE      70 - 99 mg/dL 119 (H)       Legend:  (L) Low  (H) High    24 y/o  at 39 6/7 w with GDMA2 controlled well with metformin here for IOL with cytotec. BS well controlled    Mild preeclampsia

## 2024-01-12 NOTE — OPERATIVE REPORT
[facility]     Section Delivery / Operative Note    Marisa Borrero Patient Status:  Inpatient    2000 MRN [id number]   Location [facility] Attending Meg Zuniga DO   Hosp Day # 2 PCP No primary care provider on file.     Pre Op Diagnosis:  IUP at 40w0d, failed induction of labor    Post Op Diagnosis: Same - Delivered, occiput posterior position    Procedure:  primary  LTCS     Indications:  Patient is a 23 year old year old  at 40w0d who presented for induction of labor for GDMA2. She received cytotec, then had SROM and pitocin for >24 hours with slow progress to 5 cm. Then made no additional cervical change for 8 hours . The risks, benefits and alternatives were discussed with patient including but not limited to risk of injury, infection, bleeding and subsequent  section deliveries. All questions and concerns were addressed. Patient provided verbal and written consent.     Surgeon:  Meg Zuniga DO    Assistant Surgeon:  Desi Hooper MD     Anesthesia: epidural     Complications: none     Antibiotics:  Ancef 2 grams preoperatively    QBL: pending calculation,  mL    Specimens: Cord gases and cord blood    Findings: normal uterus, normal tubes bilaterally, normal ovaries bilaterally. Live female infant in OP position, Nuchal Cord:  single nuchal  Thin meconium amniotic fluid noted.    Infant:  Date of Delivery: 2024    Time of Delivery: 5:16 PM   Delivery Type:      Infant Sex  Information for the patient's :  Adair Girl [[id number]]   female     Infant Birthweight  Information for the patient's :  Adair Girl [[id number]]   8 lb 6.8 oz (3.82 kg)      Apgars:  1 minute: 8                5 minutes: 9                Placenta  Delivery: manual extraction  Placenta to Pathology: no    Cord:  Cord Gases Submitted: yes  Cord Blood/Tissue Collection: yes    Procedure:   After informed consent was obtained, the  patient was taken to the operating room, prepped and draped in the usual sterile fashion. SCDs and a marx catheter were placed and anesthesia was found to be adequate. Two grams of ancef were given for infection prophylaxis. She was prepared and draped in the dorsal supine position with a leftward tilt. A time out was performed. A pfannenstiel skin incision was made and carried down to the fascia. The fascia was incised and extended laterally with Barry scissors. The superior aspect of the fascia was grasped with kocher clamps, and the rectus muscle was dissected off bluntly then sharply with barry scissors. In a similar fashion, the inferior aspect of the fascia was grasped with kocher clamps and the underlying rectus muscle was dissected off bluntly and then sharply with Barry scissors. Hemostasis was achieved with the bovie. The rectus muscle was  in the midline down to the level of the pubic symphysis. The peritoneum was entered bluntly and extended laterally. There was good visualization of the bladder.     The Amauri retractor was inserted and the vesicouterine peritoneum identified. The lower uterine segment was incised with a scalpel. The incision was extended bluntly with superior and inferior traction.     The fetus was in cephalic OP presentation. The head was elevated out of the pelvis to the the hysterotomy site. Gentle fundal pressure was applied and the infant was delivered without difficulty. Delayed cord clamping for 30 seconds. The cord was clamped and cut. The infant was handed off to the pediatrician. IV oxytocin was initiated to facilitate uterine contractions. Tranexamic acid was given prophylactically in anticipation of uterine atony due to prolonged induction of labor. The placenta was delivered intact with manual massage of the uterine fundus. The inside of the uterus was wiped with a lap sponge to assure complete removal of placenta membranes. Uterine tone was poor. Hemabate was  given IM and pitocin rate was increased to 600 units. The uterine incision was closed with a 0-vicryl suture in a running locked fashion. A figure of eight stitch was placed in the midline of the hysterotomy to achieve hemostasis. The uterus, tubes, and ovaries were normal appearing. Uterine tone was excellent. The Amauri retractor was removed. Re-inspection of the hysterotomy, peritomeum and rectus muscles was noted to be entirely hemostatic.    The fascia was closed with 0-vicryl suture in a running fashion. The subcutaneous tissue was copiously irrigated and any bleeding cauterized. The skin was repaired with 4-0 monocryl. Dermabond was placed on the skin. Tegaderm was placed over the skin. The uterus was expressed for excess clots and excellent hemostasis was noted.    All sponge, instrument and needle counts were correct x3. The patient toelrated the procedure well and was taken to the recovery room in a stable and satisfactory condition. The baby was in stable condition to the recovery room.     Specimen:  none    Drains: marx to dependant drainage    Condition:   stable    Complications: None; patient tolerated the procedure well.      DO AMEYA Pennington

## 2024-01-12 NOTE — ANESTHESIA POSTPROCEDURE EVALUATION
Patient: Marisa Borrero    Procedure Summary       Date: 24 Room / Location: [facility] L+D OR  [facility] L+D OR    Anesthesia Start: 1305 Anesthesia Stop:     Procedure:  SECTION (Abdomen) Diagnosis:     Surgeons: Meg Zuniga DO Anesthesiologist: Jose Palma MD    Anesthesia Type: epidural ASA Status: 2 - Emergent            Anesthesia Type: epidural    Vitals Value Taken Time   /91 24 1751   Temp 99 24 1751   Pulse 138 24 1750   Resp 14 24 1750   SpO2 100 % 24 1750   Vitals shown include unfiled device data.    [facility] AN Post Evaluation:   Patient Evaluated in PACU  Patient Participation: complete - patient participated  Level of Consciousness: awake  Pain Score: 0  Pain Management: adequate  Airway Patency:patent  Dental exam unchanged from preop  Yes    Cardiovascular Status: acceptable  Respiratory Status: acceptable  Postoperative Hydration acceptable      JOSE PALMA MD  2024 5:51 PM

## 2024-01-12 NOTE — PROGRESS NOTES
FHT's 150's, category 1  UCx q 3 - 5 on 30 of pit  SVE 5/80/-2.  Patient progressing in labor with reassuring FWB.

## 2024-01-13 LAB
BASOPHILS # BLD AUTO: 0.02 X10(3) UL (ref 0–0.2)
BASOPHILS NFR BLD AUTO: 0.2 %
DEPRECATED RDW RBC AUTO: 46.5 FL (ref 35.1–46.3)
DEPRECATED RDW RBC AUTO: 46.6 FL (ref 35.1–46.3)
EOSINOPHIL # BLD AUTO: 0.47 X10(3) UL (ref 0–0.7)
EOSINOPHIL NFR BLD AUTO: 4 %
ERYTHROCYTE [DISTWIDTH] IN BLOOD BY AUTOMATED COUNT: 16.4 % (ref 11–15)
ERYTHROCYTE [DISTWIDTH] IN BLOOD BY AUTOMATED COUNT: 16.6 % (ref 11–15)
HCT VFR BLD AUTO: 22 %
HCT VFR BLD AUTO: 25.7 %
HGB BLD-MCNC: 6.9 G/DL
HGB BLD-MCNC: 8.1 G/DL
IMM GRANULOCYTES # BLD AUTO: 0.05 X10(3) UL (ref 0–1)
IMM GRANULOCYTES NFR BLD: 0.4 %
LYMPHOCYTES # BLD AUTO: 1.11 X10(3) UL (ref 1–4)
LYMPHOCYTES NFR BLD AUTO: 9.5 %
MCH RBC QN AUTO: 24.5 PG (ref 26–34)
MCH RBC QN AUTO: 24.5 PG (ref 26–34)
MCHC RBC AUTO-ENTMCNC: 31.4 G/DL (ref 31–37)
MCHC RBC AUTO-ENTMCNC: 31.5 G/DL (ref 31–37)
MCV RBC AUTO: 77.9 FL
MCV RBC AUTO: 78 FL
MONOCYTES # BLD AUTO: 0.74 X10(3) UL (ref 0.1–1)
MONOCYTES NFR BLD AUTO: 6.4 %
NEUTROPHILS # BLD AUTO: 9.26 X10 (3) UL (ref 1.5–7.7)
NEUTROPHILS # BLD AUTO: 9.26 X10(3) UL (ref 1.5–7.7)
NEUTROPHILS NFR BLD AUTO: 79.5 %
PLATELET # BLD AUTO: 181 10(3)UL (ref 150–450)
PLATELET # BLD AUTO: 194 10(3)UL (ref 150–450)
RBC # BLD AUTO: 2.82 X10(6)UL
RBC # BLD AUTO: 3.3 X10(6)UL
WBC # BLD AUTO: 11.7 X10(3) UL (ref 4–11)
WBC # BLD AUTO: 11.7 X10(3) UL (ref 4–11)

## 2024-01-13 RX ORDER — SODIUM CHLORIDE 9 MG/ML
INJECTION, SOLUTION INTRAVENOUS ONCE
Status: DISCONTINUED | OUTPATIENT
Start: 2024-01-13 | End: 2024-01-15

## 2024-01-13 NOTE — PLAN OF CARE
Problem: PAIN - ADULT  Goal: Verbalizes/displays adequate comfort level or patient's stated pain goal  Description: INTERVENTIONS:  - Encourage pt to monitor pain and request assistance  - Assess pain using appropriate pain scale  - Administer analgesics based on type and severity of pain and evaluate response  - Implement non-pharmacological measures as appropriate and evaluate response  - Consider cultural and social influences on pain and pain management  - Manage/alleviate anxiety  - Utilize distraction and/or relaxation techniques  - Monitor for opioid side effects  - Notify MD/LIP if interventions unsuccessful or patient reports new pain  - Anticipate increased pain with activity and pre-medicate as appropriate  Outcome: Progressing     Problem: POSTPARTUM  Goal: Long Term Goal:Experiences normal postpartum course  Description: INTERVENTIONS:  - Assess and monitor vital signs and lab values.  - Assess fundus and lochia.  - Provide ice/sitz baths for perineum discomfort.  - Monitor healing of incision/episiotomy/laceration, and assess for signs and symptoms of infection and hematoma.  - Assess bladder function and monitor for bladder distention.  - Provide/instruct/assist with pericare as needed.  - Provide VTE prophylaxis as needed.  - Monitor bowel function.  - Encourage ambulation and provide assistance as needed.  - Assess and monitor emotional status and provide social service/psych resources as needed.  - Utilize standard precautions and use personal protective equipment as indicated. Ensure aseptic care of all intravenous lines and invasive tubes/drains.  - Obtain immunization and exposure to communicable diseases history.  Outcome: Progressing  Goal: Optimize infant feeding at the breast  Description: INTERVENTIONS:  - Initiate breast feeding within first hour after birth.   - Monitor effectiveness of current breast feeding efforts.  - Assess support systems available to mother/family.  - Identify  cultural beliefs/practices regarding lactation, letdown techniques, maternal food preferences.  - Assess mother's knowledge and previous experience with breast feeding.  - Provide information as needed about early infant feeding cues (e.g., rooting, lip smacking, sucking fingers/hand) versus late cue of crying.  - Discuss/demonstrate breast feeding aids (e.g., infant sling, nursing footstool/pillows, and breast pumps).  - Encourage mother/other family members to express feelings/concerns, and actively listen.  - Educate father/SO about benefits of breast feeding and how to manage common lactation challenges.  - Recommend avoidance of specific medications or substances incompatible with breast feeding.  - Assess and monitor for signs of nipple pain/trauma.  - Instruct and provide assistance with proper latch.  - Review techniques for milk expression (breast pumping) and storage of breast milk. Provide pumping equipment/supplies, instructions and assistance, as needed.  - Encourage rooming-in and breast feeding on demand.  - Encourage skin-to-skin contact.  - Provide LC support as needed.  - Assess for and manage engorgement.  - Provide breast feeding education handouts and information on community breast feeding support.   Outcome: Progressing  Goal: Establishment of adequate milk supply with medication/procedure interruptions  Description: INTERVENTIONS:  - Review techniques for milk expression (breast pumping).   - Provide pumping equipment/supplies, instructions, and assistance until it is safe to breastfeed infant.  Outcome: Progressing  Goal: Experiences normal breast weaning course  Description: INTERVENTIONS:  - Assess for and manage engorgement.  - Instruct on breast care.  - Provide comfort measures.  Outcome: Progressing  Goal: Appropriate maternal -  bonding  Description: INTERVENTIONS:  - Assess caregiver- interactions.  - Assess caregiver's emotional status and coping mechanisms.  -  Encourage caregiver to participate in  daily care.  - Assess support systems available to mother/family.  - Provide /case management support as needed.  Outcome: Progressing

## 2024-01-13 NOTE — PROGRESS NOTES
Progress note    Patient feeling better now.    Vitals:    01/12/24 2200   BP: 112/59   Pulse: 99   Resp:    Temp: 97.7 °F (36.5 °C)     Uterus firm below umbilicus.  Minimal blood in wall suction cannister.  Allison has been disconnected from suction for 1 hour. Allison balloon deflated and removed. Uterus remains firm with no clots.    Continue to monitor.  Plan for AM CBC.    Meg Zuniga, DO

## 2024-01-13 NOTE — LACTATION NOTE
LACTATION NOTE - MOTHER      Evaluation Type: Inpatient    Problems identified  Problems identified: Knowledge deficit;Milk supply not WNL;Recent antibiotic use  Milk supply not WNL: Reduced (potential)  Problems Identified Other: no latch achieved, recovering from difficult delivery    Maternal history  Maternal history: Anxiety;Induction of labor;Caesarean section;Gestational diabetes    Breastfeeding goal  Breastfeeding goal: To maintain breast milk feeding per patient goal    Maternal Assessment  Breastfeeding Assistance: LC assistance declined at this time         Guidelines for use of:  Current use of pump:: not using  Suggested use of pump: Pump each time a supplement is offered;Pump if infant is not latching to breast  Other (comment): Patient has denied LC help at this time. Patient had a difficult delivery and is recovering now. She does desire to breastfeed but does not want to pump or breastfeed currently. Education given on breastmilk production. Encourgaed the patient to ask for lactation when ready to attempt breastfeeding or when wanting to pump.

## 2024-01-13 NOTE — PROGRESS NOTES
Event note    RN Abiel called due to large clots passing in recovery.    QBL for  1023 mL + an additional 529 mL in recovery  An additionl 648 mL = total QBL 2200 mL    On my exam, lower uterine segment boggy with more clots removed. JACQUELINE placed and vaginal balloon inflated with 120 mL and attached to wall suction.    CBC now.    Meg Zuniga, DO

## 2024-01-13 NOTE — PROGRESS NOTES
Patient transferred to mother/baby room 360 per cart in stable condition with baby and personal belongings.  Accompanied by  and staff.  Report given to Flavia mother/baby RN.

## 2024-01-13 NOTE — PLAN OF CARE
Problem: PAIN - ADULT  Goal: Verbalizes/displays adequate comfort level or patient's stated pain goal  Description: INTERVENTIONS:  - Encourage pt to monitor pain and request assistance  - Assess pain using appropriate pain scale  - Administer analgesics based on type and severity of pain and evaluate response  - Implement non-pharmacological measures as appropriate and evaluate response  - Consider cultural and social influences on pain and pain management  - Manage/alleviate anxiety  - Utilize distraction and/or relaxation techniques  - Monitor for opioid side effects  - Notify MD/LIP if interventions unsuccessful or patient reports new pain  - Anticipate increased pain with activity and pre-medicate as appropriate  Outcome: Progressing     Problem: ANXIETY  Goal: Will report anxiety at manageable levels  Description: INTERVENTIONS:  - Administer medication as ordered  - Teach and rehearse alternative coping skills  - Provide emotional support with 1:1 interaction with staff  Outcome: Progressing     Problem: Patient Centered Care  Goal: Patient preferences are identified and integrated in the patient's plan of care  Description: Interventions:  - What would you like us to know as we care for you?   - Provide timely, complete, and accurate information to patient/family  - Incorporate patient and family knowledge, values, beliefs, and cultural backgrounds into the planning and delivery of care  - Encourage patient/family to participate in care and decision-making at the level they choose  - Honor patient and family perspectives and choices  Outcome: Progressing     Problem: Patient/Family Goals  Goal: Patient/Family Long Term Goal  Description: Patient's Long Term Goal:     Interventions:  -   - See additional Care Plan goals for specific interventions  Outcome: Progressing  Goal: Patient/Family Short Term Goal  Description: Patient's Short Term Goal:     Interventions:   -   - See additional Care Plan goals for  specific interventions  Outcome: Progressing     Problem: SKIN/TISSUE INTEGRITY - ADULT  Goal: Skin integrity remains intact  Description: INTERVENTIONS  - Assess and document risk factors for pressure ulcer development  - Assess and document skin integrity  - Monitor for areas of redness and/or skin breakdown  - Initiate interventions, skin care algorithm/standards of care as needed  Outcome: Progressing  Goal: Incision(s), wounds(s) or drain site(s) healing without S/S of infection  Description: INTERVENTIONS:  - Assess and document risk factors for pressure ulcer development  - Assess and document skin integrity  - Assess and document dressing/incision, wound bed, drain sites and surrounding tissue  - Implement wound care per orders  - Initiate isolation precautions as appropriate  - Initiate Pressure Ulcer prevention bundle as indicated  Outcome: Progressing  Goal: Oral mucous membranes remain intact  Description: INTERVENTIONS  - Assess oral mucosa and hygiene practices  - Implement preventative oral hygiene regimen  - Implement oral medicated treatments as ordered  Outcome: Progressing     Problem: POSTPARTUM  Goal: Long Term Goal:Experiences normal postpartum course  Description: INTERVENTIONS:  - Assess and monitor vital signs and lab values.  - Assess fundus and lochia.  - Provide ice/sitz baths for perineum discomfort.  - Monitor healing of incision/episiotomy/laceration, and assess for signs and symptoms of infection and hematoma.  - Assess bladder function and monitor for bladder distention.  - Provide/instruct/assist with pericare as needed.  - Provide VTE prophylaxis as needed.  - Monitor bowel function.  - Encourage ambulation and provide assistance as needed.  - Assess and monitor emotional status and provide social service/psych resources as needed.  - Utilize standard precautions and use personal protective equipment as indicated. Ensure aseptic care of all intravenous lines and invasive  tubes/drains.  - Obtain immunization and exposure to communicable diseases history.  Outcome: Progressing  Goal: Optimize infant feeding at the breast  Description: INTERVENTIONS:  - Initiate breast feeding within first hour after birth.   - Monitor effectiveness of current breast feeding efforts.  - Assess support systems available to mother/family.  - Identify cultural beliefs/practices regarding lactation, letdown techniques, maternal food preferences.  - Assess mother's knowledge and previous experience with breast feeding.  - Provide information as needed about early infant feeding cues (e.g., rooting, lip smacking, sucking fingers/hand) versus late cue of crying.  - Discuss/demonstrate breast feeding aids (e.g., infant sling, nursing footstool/pillows, and breast pumps).  - Encourage mother/other family members to express feelings/concerns, and actively listen.  - Educate father/SO about benefits of breast feeding and how to manage common lactation challenges.  - Recommend avoidance of specific medications or substances incompatible with breast feeding.  - Assess and monitor for signs of nipple pain/trauma.  - Instruct and provide assistance with proper latch.  - Review techniques for milk expression (breast pumping) and storage of breast milk. Provide pumping equipment/supplies, instructions and assistance, as needed.  - Encourage rooming-in and breast feeding on demand.  - Encourage skin-to-skin contact.  - Provide LC support as needed.  - Assess for and manage engorgement.  - Provide breast feeding education handouts and information on community breast feeding support.   Outcome: Progressing  Goal: Establishment of adequate milk supply with medication/procedure interruptions  Description: INTERVENTIONS:  - Review techniques for milk expression (breast pumping).   - Provide pumping equipment/supplies, instructions, and assistance until it is safe to breastfeed infant.  Outcome: Progressing  Goal: Experiences  normal breast weaning course  Description: INTERVENTIONS:  - Assess for and manage engorgement.  - Instruct on breast care.  - Provide comfort measures.  Outcome: Progressing  Goal: Appropriate maternal -  bonding  Description: INTERVENTIONS:  - Assess caregiver- interactions.  - Assess caregiver's emotional status and coping mechanisms.  - Encourage caregiver to participate in  daily care.  - Assess support systems available to mother/family.  - Provide /case management support as needed.  Outcome: Progressing

## 2024-01-13 NOTE — PROGRESS NOTES
[facility]  Obstetrics and Gynecology    OB/GYN: Postpartum Progress Note     SUBJECTIVE:  Patient is a 23 year old  female who is s/p PLTCS for failed induction of labor. She is POD# 1.   Doing well. Noted pain well controlled . Denies fever, chills, N, V, chest pain and SOB. Bleeding has been stable. Bennett still in place. Passing flatus.  No Bm. Tolerating general diet. Ambulating without difficulty.     OBJECTIVE:  Vitals:    24 0500 24 0530 24 0630 24 0653   BP:       Pulse: (!) 130 (!) 122 109 111   Resp:       Temp:       TempSrc:       SpO2: 96% 94% 96% 98%   Weight:       Height:           Physical Exam:  Lungs:   Respirations non labored   Cardiovascular:   Peripheral pulses +2 bilaterally      General:    AAA. NAD.    Abdomen Soft, nontender, nondistended   Lochia:  appropriate   Uterine Fundus:   firm below the umbilicus   Incision:  healing well, no significant drainage, no dehiscence, no significant erythema with tegaderm in place    DVT Evaluation:  No evidence of DVT seen on physical exam.  Negative Moon's sign.  No cords or calf tenderness.  No significant calf/ankle edema.     Urinary output is adequate.     Labs:  Recent Labs   Lab 24  0558   RBC 2.82*   HGB 6.9*   HCT 22.0*   MCV 78.0*   MCH 24.5*   MCHC 31.4   RDW 16.6*   NEPRELIM 9.26*   WBC 11.7*   .0       ASSESSMENT/PLAN:  Patient is a 23 year old  female who is s/p PLTCS POD# 1, c/b PPH now stable.     Doing well   Continue routine postpartum care  Vitals per routine   Encourage ambulation and IS use     Postpartum hemorrhage --> acute blood loss anemia  - already had 1 unit RBC, hgb down to 6.9 this AM, will give one more unit RBC and recheck HGB 4 hours after it runs.      DO AMEYA Pennington

## 2024-01-14 LAB
BLOOD TYPE BARCODE: 5100
BLOOD TYPE BARCODE: 5100
UNIT VOLUME: 350 ML
UNIT VOLUME: 350 ML

## 2024-01-14 NOTE — PROGRESS NOTES
[facility]  Obstetrics and Gynecology    OB/GYN: Postpartum Progress Note     SUBJECTIVE:  Patient is a 23 year old  female who is s/p PLTCS. She is POD# 2.   Doing well. Noted continued Itching Otherwise No Complaints. Denies fever, chills, N, V, chest pain and SOB. Bleeding has been stable.  Voiding without difficulty.  Passing flatus.  No Bm. Tolerating general diet. Ambulating without difficulty.     OBJECTIVE:  Vitals:    24 1957 24 0009 24 0402 24 1000   BP: 113/81 132/78 138/81 130/77   Pulse: 104 95 98 92   Resp: 18   16   Temp: 98 °F (36.7 °C)   98 °F (36.7 °C)   TempSrc: Oral   Oral   SpO2:       Weight:       Height:           Physical Exam:  Lungs:   Respirations non labored   Cardiovascular:   Peripheral pulses +2 bilaterally      General:    AAA. NAD.    Abdomen Soft, nontender, nondistended   Lochia:  appropriate   Uterine Fundus:   firm at the umbilicus   Incision:  healing well, no significant drainage, no dehiscence, no significant erythema with Tegaderm in place    DVT Evaluation:  No evidence of DVT seen on physical exam.  Negative Moon's sign.  No cords or calf tenderness.  No pitting calf/ankle edema.     Urinary output is adequate. (When recorded)    Labs:  Recent Labs   Lab 24  0558 24  1615   RBC 2.82* 3.30*   HGB 6.9* 8.1*   HCT 22.0* 25.7*   MCV 78.0* 77.9*   MCH 24.5* 24.5*   MCHC 31.4 31.5   RDW 16.6* 16.4*   NEPRELIM 9.26*  --    WBC 11.7* 11.7*   .0 194.0       ASSESSMENT/PLAN:  Patient is a 23 year old  female who is s/p PLTCS POD# 2.     Doing well   Continue routine postpartum care  Vitals per routine   Encourage ambulation and IS use   Plan for discharge to home on postoperative day #3.  Follow up in 2&6 weeks        Dr. Jase Dowd MD    [facility]     This note was created by [employer] voice recognition. Errors in content may be related to improper recognition by the system; efforts to review and  correct have been done but errors may still exist. Please be advised the primary purpose of this note is for me to communicate medical care. Standard sentence structure is not always used. Medical terminology and medical abbreviations may be used. There may be grammatical, typographical, and automated fill ins that may have errors missed in proofreading.

## 2024-01-15 VITALS
WEIGHT: 199 LBS | DIASTOLIC BLOOD PRESSURE: 79 MMHG | TEMPERATURE: 98 F | HEIGHT: 65 IN | HEART RATE: 83 BPM | BODY MASS INDEX: 33.15 KG/M2 | OXYGEN SATURATION: 97 % | SYSTOLIC BLOOD PRESSURE: 133 MMHG | RESPIRATION RATE: 16 BRPM

## 2024-01-15 PROBLEM — Z34.90 PREGNANCY: Status: RESOLVED | Noted: 2024-01-10 | Resolved: 2024-01-15

## 2024-01-15 PROBLEM — Z34.90 PREGNANCY (HCC): Status: RESOLVED | Noted: 2024-01-10 | Resolved: 2024-01-15

## 2024-01-15 RX ORDER — GABAPENTIN 300 MG/1
300 CAPSULE ORAL EVERY 8 HOURS PRN
Qty: 30 CAPSULE | Refills: 0 | Status: SHIPPED | OUTPATIENT
Start: 2024-01-15

## 2024-01-15 RX ORDER — IBUPROFEN 600 MG/1
600 TABLET ORAL EVERY 6 HOURS PRN
Qty: 30 TABLET | Refills: 0 | Status: SHIPPED | OUTPATIENT
Start: 2024-01-15

## 2024-01-15 RX ORDER — MELATONIN
325 2 TIMES DAILY WITH MEALS
Qty: 60 TABLET | Refills: 0 | Status: SHIPPED | OUTPATIENT
Start: 2024-01-15

## 2024-01-15 RX ORDER — DOCUSATE SODIUM 100 MG/1
100 CAPSULE, LIQUID FILLED ORAL 2 TIMES DAILY
Qty: 30 CAPSULE | Refills: 0 | Status: SHIPPED | OUTPATIENT
Start: 2024-01-15

## 2024-01-15 NOTE — DISCHARGE SUMMARY
[facility]    Discharge Summary    Marisa Borrero Patient Status:  Inpatient    2000 MRN [id number]   Location [facility] 3SE Attending Meg Zuniga DO   Hosp Day # 5 PCP No primary care provider on file.     Date of Admission: 1/10/2024    Date of Discharge: 1/15/2024  Discharge Time:     Admission Diagnoses: pregnancy, gestational diabetes controlled with oral meds, mild preeclampsia    Secondary Diagnosis: failure to progress with acute blood loss anemia    Primary OB Clinician: [facility] Course:     EDC: Estimated Date of Delivery: 24    Gestational Age: 40w0d    Date of Delivery: 2024   Time of Delivery: 5:16 PM     Antepartum complications: gestational diabetes, mild preeclampsia    [facility]    Delivery Type: Caesarean Section     Tubal Ligation: n/a    Baby: Liveborn female,     Apgars:  1 minute:   8                  5 minutes: 9                           10 minutes:       Anesthesia: epidural      Surgical Procedures       Case IDs Date Procedure Surgeon Location Status    [id number] 24  SECTION Meg Zuniga,  EM L+D OR Comp            Intrapartum Complications: Failure to Progress, postpartum hemorrhage with anemia requiring IV blood transfusion    Laceration: none    Episiotomy: none    Placenta: manual removal    Feeding Method: breast fed    Rh Immune Globulin Given: no    Rubella Vaccine Given: no        Discharge Plan:   Discharge Condition: Good  Early Discharge:  NO    Discharge medications:  Current Discharge Medication List        New Orders    Details   ibuprofen 600 MG Oral Tab Take 1 tablet (600 mg total) by mouth every 6 (six) hours as needed for Pain (for pain).      gabapentin 300 MG Oral Cap Take 1 capsule (300 mg total) by mouth every 8 (eight) hours as needed.      Acetaminophen 500 MG Oral Cap Take 2 capsules (1,000 mg total) by mouth every 6 (six) hours as needed for Pain.      ferrous sulfate 325 (65 FE)  MG Oral Tab EC Take 1 tablet (325 mg total) by mouth 2 (two) times daily with meals.      docusate sodium 100 MG Oral Cap Take 1 capsule (100 mg total) by mouth 2 (two) times daily.           Home Meds - Unchanged    Details   prenatal vitamin with DHA 27-0.8-228 MG Oral Cap Take 1 capsule by mouth daily.                   Discharge Diet: General diet    Discharge Activity: As tolerated    Follow up:      Follow-up Information       [facility]. Schedule an appointment as soon as possible for a visit.    Specialty: Pediatrics  Why: As needed  Contact information:  [address]  [facility] [id number] [phone]  Additional information:  Masks are optional for all patients and visitors, unless otherwise indicated.             Meg Zuniga, DO Follow up.    Specialty: OBSTETRICS & GYNECOLOGY  Contact information:  [address]  [facility] [id number] [phone]                                   Other Discharge Instructions:         Pelvic rest for 6 weeks. No sex, tampons, nothing in the vagina. No sex, tampons, hot tubs or jacuzzis.    No direct water/soap on inicsion. Incision may get wet and soapy, just not directly. May run down.   Pat dry really well before covering incision with clothes.   Watch for any signs of infection: redness, swelling, pain to the touch, foul smelling odor or discharge or fever.  No heavy lifting, nothing greater then 10-15 pounds. Nothing heavier then the baby.   No strenuous activity including exercise, excessive stairs or heavy housework.   No driving for at least 2 weeks by yourself or while taking narcotics.    Call Md for any questions or concerns including: temp over 100.4, increased pain, increased bleeding or any signs of postpartum depression.     [facility] has great support for our families even after discharge.  We have virtual or in-person support groups.  Visit our website for the most up-to-date info for our many  different support groups. https://www.health.org/services/pregnancy-baby/resources/       Outpatient Lactation appoints.  Call [phone]- to schedule an appt.  Our office is located in the Maternal Fetal Medicine office next to [facility] on the first floor.      New Moms Support Groups  Our weekly New Mom Support Groups are for any new parents in our community. They are led by an experienced Mother/Baby nurse or IBCLC and usually include a guest speaker on a topic of interest to new parents. These in-person groups also include Breastfeeding Support at each meeting. Bring your baby ( - 6 months) with you! Moms-to-be are also welcome! All mom's welcome even if its not your first.     MOM & BABY HOUR   Meets most  10:00 - 11:30 a.m.  Masks are not required, but be considerate of others and do not attend if mom or baby have had any symptoms of illness within the previous 24 hours. Breastfeeding support will be included at each session--just ask the leader any breastfeeding questions you may have. Location [facility] [place] [address] [place] Go inside the front door and to the right to the “Community Education Room”.    Mom's Line: [phone]   This service is provided by [facility], has a phone line dedicated for women (or anyone worried about a women) who may be experiencing signs or symptoms of postpartum depression.    Nurturing Mom- A support group for new and expectant moms looking for support with the transition to parenthood as well as those experiencing symptoms of  anxiety and/or depression.  Please contact [email or web address] if you need directions or the link for the virtual meetings. Please contact [email or web address] if you plan to attend, but please be considerate of others and do not attend if mom or baby have had any symptoms of illness within the previous 24 hours.     La Leche League  for breast feeding and parent support, Website: IIIus.org  and for the [place] group and other groups visit https://www.facebook.com/miko/Jeovanny/events/.  to help find a group, all meetings are virtual.     Facebook groups-  for more support when home- Babies & Mommies of [facility] --- you can find mom-to-mom advice and the list of speaker topics for cradle talk program.     Helpful websites:    www.llli.org  www.SigFig  www.Breastfeed[place].org    Other Discharge Instructions:           Post  Section Home Care Instructions   Pelvic rest for 6 weeks. No sex, tampons, baths or swimming. May shower.  No heavy lifting, nothing greater then 10-15 pounds.  No strenuous activity.  No driving for at least 2 weeks and you are no longer taking narcotics (norco).  Do not soak your incision/wound. Look at incision at least 2 times a day. If any signs/symptoms of infection, let OB office know (redness, swelling, pain, foul smelling odor or discharge).  Call MD for any questions or concerns, temp over 100.4, increased pain, increased bleeding, foul smelling odor/discharge from vagina/incision or signs of postpartum depression.      We hope you were pleased with your care at [facility].  We wish you the best outcome and overall experience with the delivery of your baby.  These instructions will help to minimize pain, limit the risk for an infection, and improve the likelihood of a successful recovery.    What to Expect:  Abdominal cramping after delivery especially if you are breastfeeding.   Vaginal bleeding for about 4-6 weeks that may be followed by a yellow or white discharge for a few more weeks.  Your period will resume in approximately 6-8 weeks, unless you are breastfeeding.    If you are bottle feeding, you may notice breast engorgement in about 3 days.  Your breast may be sore and hard. Please wear a tight fitted bra or sports bra for 24-36 hours to help prevent your breast  from producing milk, and use ice packs to relive any discomfort.  If you are breastfeeding, nipple dryness is very common the first few days.    Constipation is common after having a baby.  Please increase fluid and fiber in your diet.      Over-The-Counter Medication  Non-prescription anti-inflammatory medications can also help to ease the pain.  You may take Aleve, Tylenol or Ibuprofen   Colace or Metamucil for Constipation  Lanolin for dry nipples  Tucks, Witch Hazel and Epifoam for vaginal/perineum discomfort.   Drink a full glass of water with oral medication and take as directed.    Wound Care  The following instructions will promote proper healing and help to prevent infection  Please use soap and water over incision   Pat your incision dry and leave open to air if possible   If you have steri - strips, then please remove after 4-5 days from your surgery. You may remove after a shower to decrease discomfort.   Do not replace the Steri-Strips, if they come off.  If the tapes come off, leave them off and keep the incision clean.  You do not need to cover the incision or put any medications on the incision.    Bathing/Showers  You may resume showers  No baths, swimming, hot tubs until your post-partum visit    Home Medication  Resume your home medications as instructed    Diet  Resume your normal diet    Activity  Refrain from vaginal intercourse, vaginal suppositories, tampon use or douches until after your post-partum visit  No exercising for 4 weeks  You may climb stairs minimally for the 1st week.    Do not do heavy housework for at least 2-3 weeks    Return to Work or School  You may return to work in 6-8 weeks  Contact your obstetrician’s office, if you need a medical release. ([phone])    Driving  Avoid driving for 1-2 weeks or sooner if not taking narcotics.    Follow-up Appointment with Your Obstetrician  Call your obstetrician’s office today for an appointment in four weeks.    The number is  [phone].  Verify your appointment date, day, time, and location.  At your 1st post-partum office visit:  Your progress will be evaluated, findings reviewed, and any additional concerns and instructions will be discussed.    Questions or Concerns  Call your obstetrician’s office if you experience the following:  Severe pain not controlled by pain medication  Foul smelling vaginal discharge  Heavy bleeding  Shortness of breath  Fever  Redness, increased swelling or drainage from your incision  Crying and periods of sadness that prevents you from caring for yourself and your baby  Burning sensation during urination or inability to urinate  Swelling, redness or abnormal warmth to your leg/calf  Please call [phone]. If your call is made after office hours, a physician will be available to help you.  There is always a provider covering our patients.    Thank you for coming to [facility] to start your new family.  The nurses, obstetricians, and the anesthesiologists try very hard to make sure you receive the best care possible.  Your trust in them as well as us is greatly appreciated.    Thanks so much,   The Providers of [facility] Obstetrics and Gynecology Call if fever greater than 101, worsening pain, nausea or vomiting, heavy vaginal bleeding.    No sex tampons or douching for 6 weeks    No lifting greater than 20 lbs    It is ok to shower, but do not soak your wound.    You may drive once you are no longer taking narcotics ([place]).    Please call if you have persistent headaches, worsening swelling, pain under your right ribs, or blurry vision.    Please let us know if you are having any signs or symptoms of post partum depression - persistent sadness, feelings of hopelessness or worthlessness, sleeping or eating too much or too little, feeling like you would like to hurt yourself.           Yvonne Cuba MD  1/15/2024

## 2024-01-15 NOTE — DISCHARGE INSTRUCTIONS
Pelvic rest for 6 weeks. No sex, tampons, nothing in the vagina. No sex, tampons, hot tubs or jacuzzis.    No direct water/soap on inicsion. Incision may get wet and soapy, just not directly. May run down.   Pat dry really well before covering incision with clothes.   Watch for any signs of infection: redness, swelling, pain to the touch, foul smelling odor or discharge or fever.  No heavy lifting, nothing greater then 10-15 pounds. Nothing heavier then the baby.   No strenuous activity including exercise, excessive stairs or heavy housework.   No driving for at least 2 weeks by yourself or while taking narcotics.    Call Md for any questions or concerns including: temp over 100.4, increased pain, increased bleeding or any signs of postpartum depression.     [facility] has great support for our families even after discharge.  We have virtual or in-person support groups.  Visit our website for the most up-to-date info for our many different support groups. [email or web address]/resources/       Outpatient Lactation appoints.  Call [phone]- to schedule an appt.  Our office is located in the Maternal Fetal Medicine office next to [facility] on the first floor.      New Moms Support Groups  Our weekly New Mom Support Groups are for any new parents in our community. They are led by an experienced Mother/Baby nurse or IBCLC and usually include a guest speaker on a topic of interest to new parents. These in-person groups also include Breastfeeding Support at each meeting. Bring your baby ( - 6 months) with you! Moms-to-be are also welcome! All mom's welcome even if its not your first.     MOM & BABY HOUR   Meets most  10:00 - 11:30 a.m.  Masks are not required, but be considerate of others and do not attend if mom or baby have had any symptoms of illness within the previous 24 hours. Breastfeeding support will be included at each session--just ask the leader any  breastfeeding questions you may have. Location [facility] [place] [address] [place] Go inside the front door and to the right to the “Community Education Room”.    Mom's Line: [phone]   This service is provided by [facility], has a phone line dedicated for women (or anyone worried about a women) who may be experiencing signs or symptoms of postpartum depression.    Nurturing Mom- A support group for new and expectant moms looking for support with the transition to parenthood as well as those experiencing symptoms of  anxiety and/or depression.  Please contact [email or web address] if you need directions or the link for the virtual meetings. Please contact [email or web address] if you plan to attend, but please be considerate of others and do not attend if mom or baby have had any symptoms of illness within the previous 24 hours.     La Leche League for breast feeding and parent support, Website: [employer].GameAnalytics  and for the [place] group and other groups visit https://www.Trunkbow.com/pg/Jeovanny/events/.  to help find a group, all meetings are virtual.     Facebook groups-  for more support when home- Babies & Mommies of [facility] --- you can find mom-to-mom advice and the list of speaker topics for cradle talk program.     Helpful websites:    www.llli.org  www."[employer].Jmdedu.com  www.Breastfeed[place].org    Other Discharge Instructions:           Post  Section Home Care Instructions   Pelvic rest for 6 weeks. No sex, tampons, baths or swimming. May shower.  No heavy lifting, nothing greater then 10-15 pounds.  No strenuous activity.  No driving for at least 2 weeks and you are no longer taking narcotics (norco).  Do not soak your incision/wound. Look at incision at least 2 times a day. If any signs/symptoms of infection, let OB office know (redness, swelling, pain, foul smelling odor or discharge).  Call  MD for any questions or concerns, temp over 100.4, increased pain, increased bleeding, foul smelling odor/discharge from vagina/incision or signs of postpartum depression.      We hope you were pleased with your care at [facility].  We wish you the best outcome and overall experience with the delivery of your baby.  These instructions will help to minimize pain, limit the risk for an infection, and improve the likelihood of a successful recovery.    What to Expect:  Abdominal cramping after delivery especially if you are breastfeeding.   Vaginal bleeding for about 4-6 weeks that may be followed by a yellow or white discharge for a few more weeks.  Your period will resume in approximately 6-8 weeks, unless you are breastfeeding.    If you are bottle feeding, you may notice breast engorgement in about 3 days.  Your breast may be sore and hard. Please wear a tight fitted bra or sports bra for 24-36 hours to help prevent your breast from producing milk, and use ice packs to relive any discomfort.  If you are breastfeeding, nipple dryness is very common the first few days.    Constipation is common after having a baby.  Please increase fluid and fiber in your diet.      Over-The-Counter Medication  Non-prescription anti-inflammatory medications can also help to ease the pain.  You may take Aleve, Tylenol or Ibuprofen   Colace or Metamucil for Constipation  Lanolin for dry nipples  Tucks, Witch Hazel and Epifoam for vaginal/perineum discomfort.   Drink a full glass of water with oral medication and take as directed.    Wound Care  The following instructions will promote proper healing and help to prevent infection  Please use soap and water over incision   Pat your incision dry and leave open to air if possible   If you have steri - strips, then please remove after 4-5 days from your surgery. You may remove after a shower to decrease discomfort.   Do not replace the Steri-Strips, if they come off.  If the  tapes come off, leave them off and keep the incision clean.  You do not need to cover the incision or put any medications on the incision.    Bathing/Showers  You may resume showers  No baths, swimming, hot tubs until your post-partum visit    Home Medication  Resume your home medications as instructed    Diet  Resume your normal diet    Activity  Refrain from vaginal intercourse, vaginal suppositories, tampon use or douches until after your post-partum visit  No exercising for 4 weeks  You may climb stairs minimally for the 1st week.    Do not do heavy housework for at least 2-3 weeks    Return to Work or School  You may return to work in 6-8 weeks  Contact your obstetrician’s office, if you need a medical release. ([phone])    Driving  Avoid driving for 1-2 weeks or sooner if not taking narcotics.    Follow-up Appointment with Your Obstetrician  Call your obstetrician’s office today for an appointment in four weeks.    The number is [phone].  Verify your appointment date, day, time, and location.  At your 1st post-partum office visit:  Your progress will be evaluated, findings reviewed, and any additional concerns and instructions will be discussed.    Questions or Concerns  Call your obstetrician’s office if you experience the following:  Severe pain not controlled by pain medication  Foul smelling vaginal discharge  Heavy bleeding  Shortness of breath  Fever  Redness, increased swelling or drainage from your incision  Crying and periods of sadness that prevents you from caring for yourself and your baby  Burning sensation during urination or inability to urinate  Swelling, redness or abnormal warmth to your leg/calf  Please call [phone]. If your call is made after office hours, a physician will be available to help you.  There is always a provider covering our patients.    Thank you for coming to [facility] to start your new family.  The nurses, obstetricians, and the  anesthesiologists try very hard to make sure you receive the best care possible.  Your trust in them as well as us is greatly appreciated.    Thanks so much,   The Providers of [facility] Obstetrics and Gynecology Call if fever greater than 101, worsening pain, nausea or vomiting, heavy vaginal bleeding.    No sex tampons or douching for 6 weeks    No lifting greater than 20 lbs    It is ok to shower, but do not soak your wound.    You may drive once you are no longer taking narcotics ([place]).    Please call if you have persistent headaches, worsening swelling, pain under your right ribs, or blurry vision.    Please let us know if you are having any signs or symptoms of post partum depression - persistent sadness, feelings of hopelessness or worthlessness, sleeping or eating too much or too little, feeling like you would like to hurt yourself.

## 2024-01-15 NOTE — LACTATION NOTE
01/15/24 0900   Evaluation Type   Evaluation Type Inpatient   Problems identified   Problems identified Knowledge deficit   Problems Identified Other Discharge home today, bottle feeding, offered lactation assistance prior to discharge, pt declines needs or questions at this time   Breastfeeding goal   Breastfeeding goal To maintain breast milk feeding per patient goal   Maternal Assessment   Prior breastfeeding experience (comment below) Primip   Breastfeeding Assistance LC assistance declined at this time   Guidelines for use of:   Other (comment) Report given to MB RN. Reviewed continued lactation support via warm line and OP services as needed

## 2024-01-15 NOTE — PLAN OF CARE

## 2024-01-15 NOTE — PROGRESS NOTES
Pain well controlled. Ambulating well. Tolerating regular iet.     Patient Vitals for the past 24 hrs:   BP Temp Temp src Pulse Resp   01/15/24 0834 133/79 98.1 °F (36.7 °C) Oral 83 16   01/14/24 2000 -- 98.2 °F (36.8 °C) Oral -- 18   01/14/24 1800 129/83 -- -- 79 --     ABD: soft, nontender, wound cdi  EXT: no calf tenderness    POD#3 c/s for FTP with mild preeclampsia.and acute blood loss anemia. DW pt discharge instructions. FU in 6 W.

## 2024-01-16 NOTE — PAYOR COMM NOTE
--------------  DISCHARGE REVIEW    Payor: [facility] OUT OF STATE PPO  Subscriber #:  [id number]  Authorization Number: [id number]    Admit date: 1/10/24  Admit time:   8:56 PM  Discharge Date: 1/15/2024  3:20 PM     Admitting Physician: Meg Zuniga DO  Attending Physician:  No att. providers found  Primary Care Physician: No primary care provider on file.          Discharge Summary Notes        Discharge Summary signed by Yvonne Cuba MD at 1/15/2024 12:46 PM       Author: Yvonne Cuba MD Specialty: OBSTETRICS & GYNECOLOGY Author Type: Physician    Filed: 1/15/2024 12:46 PM Date of Service: 1/15/2024 12:43 PM Status: Signed    : Yvonne Cuba MD (Physician)           [facility]    Discharge Summary    Marisa Borrero Patient Status:  Inpatient    2000 MRN [id number]   Location [facility] 3SE Attending Meg Zuniga DO   Hosp Day # 5 PCP No primary care provider on file.     Date of Admission: 1/10/2024    Date of Discharge: 1/15/2024  Discharge Time:     Admission Diagnoses: pregnancy, gestational diabetes controlled with oral meds, mild preeclampsia    Secondary Diagnosis: failure to progress with acute blood loss anemia    Primary OB Clinician: [facility] Course:     EDC: Estimated Date of Delivery: 24    Gestational Age: 40w0d    Date of Delivery: 2024   Time of Delivery: 5:16 PM     Antepartum complications: gestational diabetes, mild preeclampsia    Delivered By:Dubyel    Delivery Type: Caesarean Section     Tubal Ligation: n/a    Baby: Liveborn female,     Apgars:  1 minute:   8                  5 minutes: 9                           10 minutes:       Anesthesia: epidural      Surgical Procedures       Case IDs Date Procedure Surgeon Location Status    [id number] 24  SECTION Meg Zuniga,  [facility] L+D OR Comp            Intrapartum Complications: Failure to Progress, postpartum hemorrhage with anemia  requiring IV blood transfusion    Laceration: none    Episiotomy: none    Placenta: manual removal    Feeding Method: breast fed    Rh Immune Globulin Given: no    Rubella Vaccine Given: no        Discharge Plan:   Discharge Condition: Good  Early Discharge:  NO    Discharge medications:  Current Discharge Medication List        New Orders    Details   ibuprofen 600 MG Oral Tab Take 1 tablet (600 mg total) by mouth every 6 (six) hours as needed for Pain (for pain).      gabapentin 300 MG Oral Cap Take 1 capsule (300 mg total) by mouth every 8 (eight) hours as needed.      Acetaminophen 500 MG Oral Cap Take 2 capsules (1,000 mg total) by mouth every 6 (six) hours as needed for Pain.      ferrous sulfate 325 (65 FE) MG Oral Tab EC Take 1 tablet (325 mg total) by mouth 2 (two) times daily with meals.      docusate sodium 100 MG Oral Cap Take 1 capsule (100 mg total) by mouth 2 (two) times daily.           Home Meds - Unchanged    Details   prenatal vitamin with DHA 27-0.8-228 MG Oral Cap Take 1 capsule by mouth daily.                   Discharge Diet: General diet    Discharge Activity: As tolerated    Follow up:      Follow-up Information       [facility]. Schedule an appointment as soon as possible for a visit.    Specialty: Pediatrics  Why: As needed  Contact information:  [address] [facility] [address] [phone]  Additional information:  Masks are optional for all patients and visitors, unless otherwise indicated.             Meg Zuniga, DO Follow up.    Specialty: OBSTETRICS & GYNECOLOGY  Contact information:  [address] [id number] [phone]                                   Other Discharge Instructions:         Pelvic rest for 6 weeks. No sex, tampons, nothing in the vagina. No sex, tampons, hot tubs or jacuzzis.    No direct water/soap on inicsion. Incision may get wet and soapy, just not directly. May run down.   Pat dry really well before  covering incision with clothes.   Watch for any signs of infection: redness, swelling, pain to the touch, foul smelling odor or discharge or fever.  No heavy lifting, nothing greater then 10-15 pounds. Nothing heavier then the baby.   No strenuous activity including exercise, excessive stairs or heavy housework.   No driving for at least 2 weeks by yourself or while taking narcotics.    Call Md for any questions or concerns including: temp over 100.4, increased pain, increased bleeding or any signs of postpartum depression.     [facility] has great support for our families even after discharge.  We have virtual or in-person support groups.  Visit our website for the most up-to-date info for our many different support groups. [email or web address]       Outpatient Lactation appoints.  Call [phone] to schedule an appt.  Our office is located in the Maternal Fetal Medicine office next to [facility] on the first floor.      New Moms Support Groups  Our weekly New Mom Support Groups are for any new parents in our community. They are led by an experienced Mother/Baby nurse or IBCLC and usually include a guest speaker on a topic of interest to new parents. These in-person groups also include Breastfeeding Support at each meeting. Bring your baby ( - 6 months) with you! Moms-to-be are also welcome! All mom's welcome even if its not your first.     MOM & BABY HOUR   Meets most  10:00 - 11:30 a.m.  Masks are not required, but be considerate of others and do not attend if mom or baby have had any symptoms of illness within the previous 24 hours. Breastfeeding support will be included at each session--just ask the leader any breastfeeding questions you may have. Location [facility] [place] [address] [place] Go inside the front door and to the right to the “Community Education Room”.    Mom's Line: [phone]   This service is  provided by [facility], has a phone line dedicated for women (or anyone worried about a women) who may be experiencing signs or symptoms of postpartum depression.    Nurturing Mom- A support group for new and expectant moms looking for support with the transition to parenthood as well as those experiencing symptoms of  anxiety and/or depression.  Please contact @[email or web address] if you need directions or the link for the virtual meetings. Please contact @[email or web address] if you plan to attend, but please be considerate of others and do not attend if mom or baby have had any symptoms of illness within the previous 24 hours.     La Leche League for breast feeding and parent support, Website: [email or web address]EATON  and for the [place] group and other groups visit [email or web address]  to help find a group, all meetings are virtual.     Facebook groups-  for more support when home- [facility] --- you can find mom-to-mom advice and the list of speaker topics for cradle talk program.     Helpful websites:    www.llli.org  www.[employer]  www.Breastfeed[place].org    Other Discharge Instructions:           Post  Section Home Care Instructions   Pelvic rest for 6 weeks. No sex, tampons, baths or swimming. May shower.  No heavy lifting, nothing greater then 10-15 pounds.  No strenuous activity.  No driving for at least 2 weeks and you are no longer taking narcotics (norco).  Do not soak your incision/wound. Look at incision at least 2 times a day. If any signs/symptoms of infection, let OB office know (redness, swelling, pain, foul smelling odor or discharge).  Call MD for any questions or concerns, temp over 100.4, increased pain, increased bleeding, foul smelling odor/discharge from vagina/incision or signs of postpartum depression.      We hope you were pleased with your care at [facility].  We wish you the best outcome and overall experience with the delivery of your baby.  These instructions will help to minimize pain, limit the risk for an infection, and improve the likelihood of a successful recovery.    What to Expect:  Abdominal cramping after delivery especially if you are breastfeeding.   Vaginal bleeding for about 4-6 weeks that may be followed by a yellow or white discharge for a few more weeks.  Your period will resume in approximately 6-8 weeks, unless you are breastfeeding.    If you are bottle feeding, you may notice breast engorgement in about 3 days.  Your breast may be sore and hard. Please wear a tight fitted bra or sports bra for 24-36 hours to help prevent your breast from producing milk, and use ice packs to relive any discomfort.  If you are breastfeeding, nipple dryness is very common the first few days.    Constipation is common after having a baby.  Please increase fluid and fiber in your diet.      Over-The-Counter Medication  Non-prescription anti-inflammatory medications can also help to ease the pain.  You may take Aleve, Tylenol or Ibuprofen   Colace or Metamucil for Constipation  Lanolin for dry nipples  Tucks, Witch Hazel and Epifoam for vaginal/perineum discomfort.   Drink a full glass of water with oral medication and take as directed.    Wound Care  The following instructions will promote proper healing and help to prevent infection  Please use soap and water over incision   Pat your incision dry and leave open to air if possible   If you have steri - strips, then please remove after 4-5 days from your surgery. You may remove after a shower to decrease discomfort.   Do not replace the Steri-Strips, if they come off.  If the tapes come off, leave them off and keep the incision clean.  You do not need to cover the incision or put any medications on the incision.    Bathing/Showers  You may resume showers  No baths, swimming, hot tubs until your post-partum  visit    Home Medication  Resume your home medications as instructed    Diet  Resume your normal diet    Activity  Refrain from vaginal intercourse, vaginal suppositories, tampon use or douches until after your post-partum visit  No exercising for 4 weeks  You may climb stairs minimally for the 1st week.    Do not do heavy housework for at least 2-3 weeks    Return to Work or School  You may return to work in 6-8 weeks  Contact your obstetrician’s office, if you need a medical release. ([phone])    Driving  Avoid driving for 1-2 weeks or sooner if not taking narcotics.    Follow-up Appointment with Your Obstetrician  Call your obstetrician’s office today for an appointment in four weeks.    The number is [phone].  Verify your appointment date, day, time, and location.  At your 1st post-partum office visit:  Your progress will be evaluated, findings reviewed, and any additional concerns and instructions will be discussed.    Questions or Concerns  Call your obstetrician’s office if you experience the following:  Severe pain not controlled by pain medication  Foul smelling vaginal discharge  Heavy bleeding  Shortness of breath  Fever  Redness, increased swelling or drainage from your incision  Crying and periods of sadness that prevents you from caring for yourself and your baby  Burning sensation during urination or inability to urinate  Swelling, redness or abnormal warmth to your leg/calf  Please call [phone]. If your call is made after office hours, a physician will be available to help you.  There is always a provider covering our patients.    Thank you for coming to [facility] to start your new family.  The nurses, obstetricians, and the anesthesiologists try very hard to make sure you receive the best care possible.  Your trust in them as well as us is greatly appreciated.    Thanks so much,   The Providers of [facility] Call if fever greater than 101,  worsening pain, nausea or vomiting, heavy vaginal bleeding.    No sex tampons or douching for 6 weeks    No lifting greater than 20 lbs    It is ok to shower, but do not soak your wound.    You may drive once you are no longer taking narcotics ([place]).    Please call if you have persistent headaches, worsening swelling, pain under your right ribs, or blurry vision.    Please let us know if you are having any signs or symptoms of post partum depression - persistent sadness, feelings of hopelessness or worthlessness, sleeping or eating too much or too little, feeling like you would like to hurt yourself.           Yvonne Cuba MD  1/15/2024    Electronically signed by Yvonne Cuba MD on 1/15/2024 12:46 PM         REVIEWER COMMENTS

## 2024-01-26 ENCOUNTER — POSTPARTUM (OUTPATIENT)
Dept: OBGYN CLINIC | Facility: CLINIC | Age: 24
End: 2024-01-26
Payer: COMMERCIAL

## 2024-01-26 VITALS
WEIGHT: 169 LBS | BODY MASS INDEX: 28.16 KG/M2 | SYSTOLIC BLOOD PRESSURE: 112 MMHG | DIASTOLIC BLOOD PRESSURE: 70 MMHG | HEIGHT: 65 IN

## 2024-01-26 DIAGNOSIS — Z48.89 ENCOUNTER FOR POST SURGICAL WOUND CHECK: Primary | ICD-10-CM

## 2024-01-26 PROCEDURE — 3074F SYST BP LT 130 MM HG: CPT | Performed by: OBSTETRICS & GYNECOLOGY

## 2024-01-26 PROCEDURE — 3008F BODY MASS INDEX DOCD: CPT | Performed by: OBSTETRICS & GYNECOLOGY

## 2024-01-26 PROCEDURE — 99024 POSTOP FOLLOW-UP VISIT: CPT | Performed by: OBSTETRICS & GYNECOLOGY

## 2024-01-26 PROCEDURE — 3078F DIAST BP <80 MM HG: CPT | Performed by: OBSTETRICS & GYNECOLOGY

## 2024-01-26 NOTE — PROGRESS NOTES
OB Postpartum Wound Check    Chief Complaint   Patient presents with    Postpartum Care     2 wk f/u;   Pt states some vaginal bleeding and some cramping, incision healing well          S: 23 year old   here for wound check, s/p  section on 24 for failed induction of labor.  Patient with uncomplicated postpartum course.  Denies fevers, chills, nausea, vomiting, constipation, bladder sx.  Lochia slowed.  Breast and bottle feeding.  Pain controlled with only motrin every now and then.    Physical Exam  Vitals:    24 1414   BP: 112/70      Gen - alert and oriented, comfortable  Abd - soft, non-tender, non-distended.  Incision - Pfannenstiel with tegaderm removed today, clean, well approximated, no erythema, no discharge, nontender  Ext - no c/c/e  Skin - warm, dry no rash.    Assessment and Plan    ICD-10-CM    1. Encounter for post surgical wound check  Z48.89          Follow up 4 weeks for routine postpartum visit.    Meg Zuniga, DO

## 2024-02-07 ENCOUNTER — TELEPHONE (OUTPATIENT)
Dept: OBGYN CLINIC | Facility: CLINIC | Age: 24
End: 2024-02-07

## 2024-02-07 NOTE — TELEPHONE ENCOUNTER
Faith CONNORfrom [facility] is a RN calling to state if there is any questions or concerns if she can help to reach out to her she is the patient .       Number: [phone]

## 2024-02-12 ENCOUNTER — TELEPHONE (OUTPATIENT)
Dept: OBGYN UNIT | Facility: HOSPITAL | Age: 24
End: 2024-02-12

## 2024-02-27 ENCOUNTER — POSTPARTUM (OUTPATIENT)
Dept: OBGYN CLINIC | Facility: CLINIC | Age: 24
End: 2024-02-27
Payer: COMMERCIAL

## 2024-02-27 VITALS — DIASTOLIC BLOOD PRESSURE: 60 MMHG | SYSTOLIC BLOOD PRESSURE: 92 MMHG

## 2024-02-27 DIAGNOSIS — Z12.4 PAP SMEAR FOR CERVICAL CANCER SCREENING: ICD-10-CM

## 2024-02-27 PROBLEM — O61.9 FAILED INDUCTION OF LABOR (HCC): Status: RESOLVED | Noted: 2024-01-12 | Resolved: 2024-02-27

## 2024-02-27 PROBLEM — O24.415 GESTATIONAL DIABETES MELLITUS (GDM) IN THIRD TRIMESTER CONTROLLED ON ORAL HYPOGLYCEMIC DRUG (HCC): Status: RESOLVED | Noted: 2023-12-19 | Resolved: 2024-02-27

## 2024-02-27 PROCEDURE — 88175 CYTOPATH C/V AUTO FLUID REDO: CPT | Performed by: OBSTETRICS & GYNECOLOGY

## 2024-02-27 PROCEDURE — 3078F DIAST BP <80 MM HG: CPT | Performed by: OBSTETRICS & GYNECOLOGY

## 2024-02-27 PROCEDURE — 3074F SYST BP LT 130 MM HG: CPT | Performed by: OBSTETRICS & GYNECOLOGY

## 2024-02-27 NOTE — PROGRESS NOTES
[facility]  Obstetrics and Gynecology   Postpartum Progress Note  Chief Complaint   Patient presents with    Postpartum Care     6 wk f/u;         Subjective:     Marisa Borrero is a 23 year old  female who is s/p CS on 24 for failed induction of labor, OP position. Her pregnancy was uncomplicated. She reports doing well. Baby is doing well and botttle feeding. The patient reports vagina bleeding resolved, has a period.   The patient denies emotional concerns.     Period started  - is pretty typical period    No sex    No leaking urine  No problems pooping    Mood - ok.    Condoms / vcf for birth control.    Review of Systems:  General: denies fevers, chills, fatigue and malaise.   Respiratory: denies SOB, dyspnea, cough or wheezing  Cardiovascular: denies chest pain, palpitations, exercise intolerance   GI:denies abdominal pain, diarrhea, constipation  :  denies dysuria, hematuria, increased urinary frequency. denies abnormal uterine bleeding or vaginal discharge.       Objective:     Vitals:    24 1612   BP: 92/60         There is no height or weight on file to calculate BMI.    GENERAL: well developed, well nourished, in no apparent distress, alert and orientated X 3  PSYCH: mood and affect stable   SKIN: no rashes, no lesions  HEENT: normal  LUNGS: respiration unlabored  CARDIOVASCULAR: no peripheral edema or varicosities, skin warm and dry  ABDOMEN: Soft, non distended; non tender, no masses  Pfanennstiel scar well healed.  GYNE/:   External Genitalia: normal, no lesions, good perineal support  Urethra: meatus normal   Bladder: well supported  Vagina: normal mucosa, no lesions,  discharge difficult to assess due to menses; strength 4/5  Uterus: normal size, mobile, nontender  Cervix: normal os, no lesions or bleeding  Adnexa:normal size, bilaterally nontender, no palpable masses  Cul-de-sac: normal  R/V: normal perineum, no hemorrhoids  EXTREMITIES:   Nontender without edema    Labs:         Assessment:     Marisa Borrero is a 23 year old  female who presents for postpartum visit   Patient Active Problem List   Diagnosis    Anxiety    History of subarachnoid hemorrhage    History of traumatic brain injury    History of syncope    Gestational diabetes mellitus (GDM) in third trimester controlled on oral hypoglycemic drug (HCC)    Itching    Failed induction of labor (HCC)    Acute blood loss anemia (ABLA)    Other immediate postpartum hemorrhage (HCC)    Mild preeclampsia delivered (HCC)    Postpartum care following  delivery (HCC)         Plan:     Postpartum exam   - doing well, no complaints   - no abnormal findings on physical exam   - may return to normal activity   - pap collected today    Contraception counseling   - discussion held with patient about family planning and contraception  - pt desires non-hormonal options - reviewed recommendation for 1 year b/n pregnancies..     All of the findings and plan were discussed with the patient.  She notes understanding and agrees with the plan of care.  All questions were answered to the best of my ability at this time.    RTC 1 year for well woman exam or sooner if needed    Meg Zuniga,

## 2024-03-06 ENCOUNTER — E-VISIT (OUTPATIENT)
Dept: TELEHEALTH | Age: 24
End: 2024-03-06
Payer: COMMERCIAL

## 2024-03-06 DIAGNOSIS — Z30.011 ENCOUNTER FOR ORAL CONTRACEPTION INITIAL PRESCRIPTION: Primary | ICD-10-CM

## 2024-03-06 RX ORDER — NORETHINDRONE ACETATE/ETHINYL ESTRADIOL AND FERROUS FUMARATE 1MG-20(24)
1 KIT ORAL DAILY
Qty: 28 TABLET | Refills: 6 | Status: SHIPPED | OUTPATIENT
Start: 2024-03-06 | End: 2024-09-02

## 2024-03-06 NOTE — PROGRESS NOTES
Marisa Borrero is a 23 year old female.  HPI:   See answers to questions above.     Current Outpatient Medications   Medication Sig Dispense Refill    Norethin Ace-Eth Estrad-FE (MICROGESTIN 24 FE) 1-20 MG-MCG(24) Oral Tab Take 1 tablet by mouth daily. 28 tablet 6    ferrous sulfate 325 (65 FE) MG Oral Tab EC Take 1 tablet (325 mg total) by mouth 2 (two) times daily with meals. 60 tablet 0    docusate sodium 100 MG Oral Cap Take 1 capsule (100 mg total) by mouth 2 (two) times daily. 30 capsule 0    prenatal vitamin with DHA 27-0.8-228 MG Oral Cap Take 1 capsule by mouth daily.        Past Medical History:   Diagnosis Date    Iron deficiency       Past Surgical History:   Procedure Laterality Date      2024      Family History   Problem Relation Age of Onset    No Known Problems Father     No Known Problems Mother     Hypertension Paternal Grandmother     High Cholesterol Paternal Grandmother       Social History:  Social History     Socioeconomic History    Marital status:    Tobacco Use    Smoking status: Never    Smokeless tobacco: Never   Vaping Use    Vaping Use: Never used   Substance and Sexual Activity    Alcohol use: Not Currently    Drug use: Never   Other Topics Concern    Caffeine Concern No    Special Diet No     Social Determinants of Health     Financial Resource Strain: Low Risk  (1/10/2024)    Financial Resource Strain     Difficulty of Paying Living Expenses: Not hard at all     Med Affordability: No   Food Insecurity: No Food Insecurity (1/10/2024)    Food Insecurity     Food Insecurity: Never true   Transportation Needs: No Transportation Needs (1/10/2024)    Transportation Needs     Lack of Transportation: No   Stress: No Stress Concern Present (1/10/2024)    Stress     Feeling of Stress : No   Housing Stability: Low Risk  (1/10/2024)    Housing Stability     Housing Instability: No         ASSESSMENT AND PLAN:     Encounter Diagnosis   Name Primary?    Encounter for oral  contraception initial prescription Yes     Advised f/u in 6 months with GYN for further refills.      Meds & Refills for this Visit:  Requested Prescriptions     Signed Prescriptions Disp Refills    Norethin Ace-Eth Estrad-FE (MICROGESTIN 24 FE) 1-20 MG-MCG(24) Oral Tab 28 tablet 6     Sig: Take 1 tablet by mouth daily.       Duration of  the service:  10 minutes    Patient advised to follow up with PCP if no improvement or worsening of symptoms  Refer to [employer] message for specific patient instructions

## 2024-03-12 ENCOUNTER — OFFICE VISIT (OUTPATIENT)
Dept: OBGYN CLINIC | Facility: CLINIC | Age: 24
End: 2024-03-12
Payer: COMMERCIAL

## 2024-03-12 VITALS
WEIGHT: 162.38 LBS | BODY MASS INDEX: 27.05 KG/M2 | SYSTOLIC BLOOD PRESSURE: 116 MMHG | HEIGHT: 65 IN | DIASTOLIC BLOOD PRESSURE: 72 MMHG

## 2024-03-12 DIAGNOSIS — Z30.09 FAMILY PLANNING COUNSELING: Primary | ICD-10-CM

## 2024-03-12 PROCEDURE — 3008F BODY MASS INDEX DOCD: CPT | Performed by: OBSTETRICS & GYNECOLOGY

## 2024-03-12 PROCEDURE — 3074F SYST BP LT 130 MM HG: CPT | Performed by: OBSTETRICS & GYNECOLOGY

## 2024-03-12 PROCEDURE — 99213 OFFICE O/P EST LOW 20 MIN: CPT | Performed by: OBSTETRICS & GYNECOLOGY

## 2024-03-12 PROCEDURE — 96372 THER/PROPH/DIAG INJ SC/IM: CPT | Performed by: OBSTETRICS & GYNECOLOGY

## 2024-03-12 PROCEDURE — 3078F DIAST BP <80 MM HG: CPT | Performed by: OBSTETRICS & GYNECOLOGY

## 2024-03-12 RX ORDER — MEDROXYPROGESTERONE ACETATE 150 MG/ML
150 INJECTION, SUSPENSION INTRAMUSCULAR ONCE
Status: COMPLETED | OUTPATIENT
Start: 2024-03-12 | End: 2024-03-12

## 2024-03-12 RX ADMIN — MEDROXYPROGESTERONE ACETATE 150 MG: 150 INJECTION, SUSPENSION INTRAMUSCULAR at 15:41:00

## 2024-03-12 NOTE — PROGRESS NOTES
RETURN GYN OFFICE VISIT  [facility]    CHIEF COMPLAINT:    Chief Complaint   Patient presents with    Contraception     Pt interested in starting Depo provera        HISTORY OF PRESENT ILLNESS:    VIK is a 23 year old female  here for contraception. Interested in Depo Provera.   Not breastfeeding.     Contraception:  condoms  Last Pap Date: 2024 Result:  NILM      REVIEW OF SYSTEMS:   CONSTITUTIONAL:  negative for fevers, chills, sweats and fatigue  GASTROINTESTINAL:  negative for nausea, vomiting, blood in stool, constipation, diarrhea and abdominal pain  GENITOURINARY: negative for UTI  Negative for discharge.   SKIN:  negative for  rash, skin lesion and pruritus  ENDOCRINE:  negative for acne, fatigue, weight gain and weight loss  BEHAVIOR/PSYCH:  negative for depressed mood, anhedonia and anxiety    CURRENT MEDICATIONS:      Current Outpatient Medications:     prenatal vitamin with DHA 27-0.8-228 MG Oral Cap, Take 1 capsule by mouth daily., Disp: , Rfl:     Norethin Ace-Eth Estrad-FE (MICROGESTIN 24 FE) 1-20 MG-MCG(24) Oral Tab, Take 1 tablet by mouth daily. (Patient not taking: Reported on 3/12/2024), Disp: 28 tablet, Rfl: 6    ferrous sulfate 325 (65 FE) MG Oral Tab EC, Take 1 tablet (325 mg total) by mouth 2 (two) times daily with meals. (Patient not taking: Reported on 3/12/2024), Disp: 60 tablet, Rfl: 0    docusate sodium 100 MG Oral Cap, Take 1 capsule (100 mg total) by mouth 2 (two) times daily. (Patient not taking: Reported on 3/12/2024), Disp: 30 capsule, Rfl: 0    PAST MEDICAL, SOCIAL AND FAMILY HISTORY:    Past Medical History:   Diagnosis Date    Iron deficiency      Past Surgical History:   Procedure Laterality Date      2024     Family History   Problem Relation Age of Onset    No Known Problems Father     No Known Problems Mother     Hypertension Paternal Grandmother     High Cholesterol Paternal Grandmother      Social History     Socioeconomic History    Marital  status:    Tobacco Use    Smoking status: Never    Smokeless tobacco: Never   Vaping Use    Vaping Use: Never used   Substance and Sexual Activity    Alcohol use: Not Currently    Drug use: Never   Other Topics Concern    Caffeine Concern No    Special Diet No           PHYSICAL EXAM:   Patient's last menstrual period was 02/24/2024 (exact date).; Body mass index is 27.02 kg/m².      CONSTITUTIONAL:  Awake, alert, cooperative, no apparent distress  EYES: sclera clear and conjunctiva normal  GASTROINTESTINAL:  normal bowel sounds, soft, non-distended, non-tender, no masses palpated  GENITAL/URINARY:  SKIN:  No rashes  PSYCH:  Affect Normal      ASSESSMENT AND PLAN:    ICD-10-CM    1. Family planning counseling  Z30.09 medroxyPROGESTERone Acetate AUGUSTO 150 mg        Risks/benefits/use/anticipated return to fertility reviewed with patient. OK to start depo provera today. Follow up 11-13 weeks next depo provera    Ashley Rivas MD

## (undated) DEVICE — ENSEAL 20 CM SHAFT, LARGE JAW: Brand: ENSEAL X1

## (undated) NOTE — LETTER
Dear New MomFrancisco, we missed you! The nurses of [facility] have tried to reach you by phone to ask if you have any questions regarding your health or the health and care of your new little one.    We hope you are doing well. If, for any reason, you have questions or concerns about your health or your baby’s health, please contact your provider or your pediatrician or family medicine physician regarding your baby.     At [facility], we feel that postpartum support is very important for new families. Please see the enclosed new parent support flyer that lists support programs and resources with both in-person and online options.     Additionally, our Breastfeeding Centers at [facility] and [facility] in [place], offer outpatient visits with our International Board-Certified Lactation   Consultants (IBCLCs) for any breastfeeding concerns or questions you may have.    For issues related to stress, anxiety or depression, we have a Nurturing Mom support group that meets both in-person or online.  There’s also a 24-hour Mom’s Line where you can request a phone call from a clinical therapist for assistance for postpartum depression.    We encourage you to take advantage of these programs and resources as you recover from childbirth and learn to care for your new infant.    Best wishes,    [facility]            [id number]

## (undated) NOTE — LETTER
VACCINE ADMINISTRATION RECORD  PARENT / GUARDIAN APPROVAL  Date: 11/15/2023  Vaccine administered to: Micah Escobedo     : 2000    MRN: [id number]    A copy of the appropriate Centers for Disease Control and Prevention Vaccine Information statement has been provided. I have read or have had explained the information about the diseases and the vaccines listed below. There was an opportunity to ask questions and any questions were answered satisfactorily. I believe that I understand the benefits and risks of the vaccine cited and ask that the vaccine(s) listed below be given to me or to the person named above (for whom I am authorized to make this request). VACCINES ADMINISTERED:  Tdap    I have read and hereby agree to be bound by the terms of this agreement as stated above. My signature is valid until revoked by me in writing.   This document is signed by Micah Escobedo, relationship: Self on 11/15/2023.:                                                                                                                                         Parent / Laura Noonan Signature                                                Date

## (undated) NOTE — LETTER
[facility]  Administration of Anesthesia  Marisa ALLEN agree to be cared for by a physician anesthesiologist alone and/or with a nurse anesthetist, who is specially trained to monitor me and give me medicine to put me to sleep or keep me comfortable during my procedure    I understand that my anesthesiologist and/or anesthetist is not an employee or agent of [facility] or [employer]. He or she works for [facility]    As the patient asking for anesthesia services, I agree to:  Allow the anesthesiologist (anesthesia doctor) to give me medicine and do additional procedures as necessary. Some examples are: Starting or using an “IV” to give me medicine, fluids or blood during my procedure, and having a breathing tube placed to help me breathe when I’m asleep (intubation). In the event that my heart stops working properly, I understand that my anesthesiologist will make every effort to sustain my life, unless otherwise directed by [facility] Do Not Resuscitate documents.  Tell my anesthesia doctor before my procedure:  If I am pregnant.  The last time that I ate or drank.  iii. All of the medicines I take (including prescriptions, herbal supplements, and pills I can buy without a prescription (including street drugs/illegal medications). Failure to inform my anesthesiologist about these medicines may increase my risk of anesthetic complications.  iv.If I am allergic to anything or have had a reaction to anesthesia before.  I understand how the anesthesia medicine will help me (benefits).  I understand that with any type of anesthesia medicine there are risks:  The most common risks are: nausea, vomiting, sore throat, muscle soreness, damage to my eyes, mouth, or teeth (from breathing tube placement).  Rare risks include: remembering what happened during my procedure, allergic reactions to medications, injury to my airway, heart, lungs, vision, nerves, or  muscles and in extremely rare instances death.  My doctor has explained to me other choices available to me for my care (alternatives).  Pregnant Patients (“epidural”):  I understand that the risks of having an epidural (medicine given into my back to help control pain during labor), include itching, low blood pressure, difficulty urinating, headache or slowing of the baby’s heart. Very rare risks include infection, bleeding, seizure, irregular heart rhythms and nerve injury.  Regional Anesthesia (“spinal”, “epidural”, & “nerve blocks”):  I understand that rare but potential complications include headache, bleeding, infection, seizure, irregular heart rhythms, and nerve injury.    _____________________________________________________________________________  Patient (or Representative) Signature/Relationship to Patient  Date   Time    _____________________________________________________________________________   Name (if used)    Language/Organization   Time    _____________________________________________________________________________  Nurse Anesthetist Signature     Date   Time  _____________________________________________________________________________  Anesthesiologist Signature     Date   Time  I have discussed the procedure and information above with the patient (or patient’s representative) and answered their questions. The patient or their representative has agreed to have anesthesia services.    _____________________________________________________________________________  Witness        Date   Time  I have verified that the signature is that of the patient or patient’s representative, and that it was signed before the procedure  Patient Name: Marisa Borrero     : 2000                 Printed: 1/10/2024 at 8:58 PM    Medical Record #: [id number]                                            Page 1 of 1  ----------ANESTHESIA CONSENT----------